# Patient Record
Sex: FEMALE | Race: WHITE | NOT HISPANIC OR LATINO | Employment: UNEMPLOYED | URBAN - METROPOLITAN AREA
[De-identification: names, ages, dates, MRNs, and addresses within clinical notes are randomized per-mention and may not be internally consistent; named-entity substitution may affect disease eponyms.]

---

## 2017-05-16 ENCOUNTER — TELEPHONE (OUTPATIENT)
Dept: MEDICAL GROUP | Facility: PHYSICIAN GROUP | Age: 62
End: 2017-05-16

## 2017-06-21 ENCOUNTER — HOSPITAL ENCOUNTER (OUTPATIENT)
Facility: MEDICAL CENTER | Age: 62
End: 2017-06-21
Payer: COMMERCIAL

## 2017-06-21 LAB
ALBUMIN SERPL BCP-MCNC: 4.3 G/DL (ref 3.2–4.9)
ALBUMIN/GLOB SERPL: 1.5 G/DL
ALP SERPL-CCNC: 80 U/L (ref 30–99)
ALT SERPL-CCNC: 25 U/L (ref 2–50)
ANION GAP SERPL CALC-SCNC: 8 MMOL/L (ref 0–11.9)
APPEARANCE UR: CLEAR
AST SERPL-CCNC: 23 U/L (ref 12–45)
BILIRUB SERPL-MCNC: 0.7 MG/DL (ref 0.1–1.5)
BILIRUB UR QL STRIP.AUTO: NEGATIVE
BUN SERPL-MCNC: 12 MG/DL (ref 8–22)
CALCIUM SERPL-MCNC: 10.1 MG/DL (ref 8.5–10.5)
CHLORIDE SERPL-SCNC: 110 MMOL/L (ref 96–112)
CHOLEST SERPL-MCNC: 172 MG/DL (ref 100–199)
CO2 SERPL-SCNC: 23 MMOL/L (ref 20–33)
COLOR UR: COLORLESS
CREAT SERPL-MCNC: 0.66 MG/DL (ref 0.5–1.4)
EPI CELLS #/AREA URNS HPF: NORMAL /HPF
ERYTHROCYTE [DISTWIDTH] IN BLOOD BY AUTOMATED COUNT: 42.9 FL (ref 35.9–50)
GFR SERPL CREATININE-BSD FRML MDRD: >60 ML/MIN/1.73 M 2
GLOBULIN SER CALC-MCNC: 2.8 G/DL (ref 1.9–3.5)
GLUCOSE SERPL-MCNC: 97 MG/DL (ref 65–99)
GLUCOSE UR STRIP.AUTO-MCNC: NEGATIVE MG/DL
HCT VFR BLD AUTO: 40 % (ref 37–47)
HDLC SERPL-MCNC: 75 MG/DL
HGB BLD-MCNC: 13.6 G/DL (ref 12–16)
KETONES UR STRIP.AUTO-MCNC: NEGATIVE MG/DL
LDLC SERPL CALC-MCNC: 86 MG/DL
LEUKOCYTE ESTERASE UR QL STRIP.AUTO: ABNORMAL
MCH RBC QN AUTO: 31.6 PG (ref 27–33)
MCHC RBC AUTO-ENTMCNC: 34 G/DL (ref 33.6–35)
MCV RBC AUTO: 92.8 FL (ref 81.4–97.8)
MICRO URNS: ABNORMAL
NITRITE UR QL STRIP.AUTO: NEGATIVE
PH UR STRIP.AUTO: 7.5 [PH]
PLATELET # BLD AUTO: 211 K/UL (ref 164–446)
PMV BLD AUTO: 10.9 FL (ref 9–12.9)
POTASSIUM SERPL-SCNC: 4.1 MMOL/L (ref 3.6–5.5)
PROT SERPL-MCNC: 7.1 G/DL (ref 6–8.2)
PROT UR QL STRIP: NEGATIVE MG/DL
RBC # BLD AUTO: 4.31 M/UL (ref 4.2–5.4)
RBC # URNS HPF: NORMAL /HPF
RBC UR QL AUTO: NEGATIVE
SODIUM SERPL-SCNC: 141 MMOL/L (ref 135–145)
SP GR UR STRIP.AUTO: 1
T4 FREE SERPL-MCNC: 0.77 NG/DL (ref 0.53–1.43)
TRIGL SERPL-MCNC: 55 MG/DL (ref 0–149)
WBC # BLD AUTO: 4.1 K/UL (ref 4.8–10.8)
WBC #/AREA URNS HPF: NORMAL /HPF

## 2017-07-27 ENCOUNTER — OFFICE VISIT (OUTPATIENT)
Dept: MEDICAL GROUP | Facility: PHYSICIAN GROUP | Age: 62
End: 2017-07-27
Payer: COMMERCIAL

## 2017-07-27 ENCOUNTER — HOSPITAL ENCOUNTER (OUTPATIENT)
Dept: LAB | Facility: MEDICAL CENTER | Age: 62
End: 2017-07-27
Attending: FAMILY MEDICINE
Payer: COMMERCIAL

## 2017-07-27 VITALS
OXYGEN SATURATION: 99 % | TEMPERATURE: 98 F | SYSTOLIC BLOOD PRESSURE: 110 MMHG | BODY MASS INDEX: 26.63 KG/M2 | WEIGHT: 175.71 LBS | DIASTOLIC BLOOD PRESSURE: 70 MMHG | HEART RATE: 57 BPM | RESPIRATION RATE: 15 BRPM | HEIGHT: 68 IN

## 2017-07-27 DIAGNOSIS — Z00.00 ROUTINE PHYSICAL EXAMINATION: ICD-10-CM

## 2017-07-27 DIAGNOSIS — L65.9 HAIR LOSS: ICD-10-CM

## 2017-07-27 DIAGNOSIS — D70.9 NEUTROPENIA, UNSPECIFIED TYPE (HCC): ICD-10-CM

## 2017-07-27 DIAGNOSIS — Z12.11 SCREEN FOR COLON CANCER: ICD-10-CM

## 2017-07-27 LAB
BASOPHILS # BLD AUTO: 0.9 % (ref 0–1.8)
BASOPHILS # BLD: 0.03 K/UL (ref 0–0.12)
EOSINOPHIL # BLD AUTO: 0.09 K/UL (ref 0–0.51)
EOSINOPHIL NFR BLD: 2.7 % (ref 0–6.9)
ERYTHROCYTE [DISTWIDTH] IN BLOOD BY AUTOMATED COUNT: 46.3 FL (ref 35.9–50)
HCT VFR BLD AUTO: 39.4 % (ref 37–47)
HGB BLD-MCNC: 12.8 G/DL (ref 12–16)
IMM GRANULOCYTES # BLD AUTO: 0.01 K/UL (ref 0–0.11)
IMM GRANULOCYTES NFR BLD AUTO: 0.3 % (ref 0–0.9)
LYMPHOCYTES # BLD AUTO: 1.13 K/UL (ref 1–4.8)
LYMPHOCYTES NFR BLD: 33.5 % (ref 22–41)
MCH RBC QN AUTO: 31.1 PG (ref 27–33)
MCHC RBC AUTO-ENTMCNC: 32.5 G/DL (ref 33.6–35)
MCV RBC AUTO: 95.6 FL (ref 81.4–97.8)
MONOCYTES # BLD AUTO: 0.33 K/UL (ref 0–0.85)
MONOCYTES NFR BLD AUTO: 9.8 % (ref 0–13.4)
NEUTROPHILS # BLD AUTO: 1.78 K/UL (ref 2–7.15)
NEUTROPHILS NFR BLD: 52.8 % (ref 44–72)
NRBC # BLD AUTO: 0 K/UL
NRBC BLD AUTO-RTO: 0 /100 WBC
PLATELET # BLD AUTO: 233 K/UL (ref 164–446)
PMV BLD AUTO: 11 FL (ref 9–12.9)
RBC # BLD AUTO: 4.12 M/UL (ref 4.2–5.4)
TSH SERPL DL<=0.005 MIU/L-ACNC: 2.39 UIU/ML (ref 0.3–3.7)
WBC # BLD AUTO: 3.4 K/UL (ref 4.8–10.8)

## 2017-07-27 PROCEDURE — 84443 ASSAY THYROID STIM HORMONE: CPT

## 2017-07-27 PROCEDURE — 85025 COMPLETE CBC W/AUTO DIFF WBC: CPT

## 2017-07-27 PROCEDURE — 36415 COLL VENOUS BLD VENIPUNCTURE: CPT

## 2017-07-27 PROCEDURE — 99386 PREV VISIT NEW AGE 40-64: CPT | Performed by: FAMILY MEDICINE

## 2017-07-27 ASSESSMENT — PATIENT HEALTH QUESTIONNAIRE - PHQ9: CLINICAL INTERPRETATION OF PHQ2 SCORE: 0

## 2017-07-27 NOTE — Clinical Note
[facility]  Uma Carrillo M.D.  [address]  [place] [address]  Fax: [phone]   Authorization for Release/Disclosure of   Protected Health Information   Name: CARMEN RAMIREZ : 1955 SSN: [id number]   Address: [facility] [place] [address] Phone:    [phone] (home)    I authorize the entity listed below to release/disclose the PHI below to:   [facility]/Uma Carrillo M.D. and Uma Carrillo M.D.   Provider or Entity Name:    Rona Jacobsen MD   Address   City, State, [facility]   Phone:      Fax:     Reason for request: continuity of care   Information to be released:    [  ] LAST COLONOSCOPY,  including any PATH REPORT and follow-up  [  ] LAST FIT/COLOGUARD RESULT [  ] LAST DEXA  [  ] LAST MAMMOGRAM  [  ] LAST PAP  [  ] LAST LABS [  ] RETINA EXAM REPORT  [  ] IMMUNIZATION RECORDS  [  ] Release all info      [  ] Check here and initial the line next to each item to release ALL health information INCLUDING  _____ Care and treatment for drug and / or alcohol abuse  _____ HIV testing, infection status, or AIDS  _____ Genetic Testing    DATES OF SERVICE OR TIME PERIOD TO BE DISCLOSED: _____________  I understand and acknowledge that:  * This Authorization may be revoked at any time by you in writing, except if your health information has already been used or disclosed.  * Your health information that will be used or disclosed as a result of you signing this authorization could be re-disclosed by the recipient. If this occurs, your re-disclosed health information may no longer be protected by State or Federal laws.  * You may refuse to sign this Authorization. Your refusal will not affect your ability to obtain treatment.  * This Authorization becomes effective upon signing and will  on (date) __________.      If no date is indicated, this Authorization will  one (1) year from the signature date.    Name: Carmen Ramirez    Signature:   Date:     2017       PLEASE  FAX REQUESTED RECORDS BACK TO: [phone]

## 2017-07-27 NOTE — PROGRESS NOTES
Subjective:      Carmen Ramirez is a 61 y.o. female who presents with Other            Other        This is a 61-year-old white female who is here to establish with me.. I previously saw her almost 4 years ago in 9/13 as a new patient.    She is here for physical exam. She sees Dr. Schreiber, GYN for her GYN care. She said she had an abnormal Pap smear that required a LEEP procedure. Subsequent Pap smears thereafter came back normal. The last one was in 11/16. She said she had a mammogram last year through [facility] which was normal.    She brought copies of lab work that she just did through [facility].    She has been having hair loss recently. Unfortunately TSH was not included in her lab work.    She has refused colonoscopy but she agrees to have the FIT. The last one was done in 3/16 which came back negative.    I reviewed the following    Past Medical History   Diagnosis Date   • Other specified symptom associated with female genital organs      abnormal pap, HPV   • Insomnia         Past Surgical History   Procedure Laterality Date   • Cervical conization  3/6/2013     Performed by Rona Jacobsen M.D. at SURGERY SAME DAY [facility] ORS   • Dilation and curettage       retained placenta       No Known Allergies    Current Outpatient Prescriptions   Medication Sig Dispense Refill   • Probiotic Product (PROBIOTIC DAILY PO) Take  by mouth.     • Multiple Vitamins-Minerals (MULTIVITAMIN PO) Take 1 Tab by mouth every day.       • Naproxen Sodium (ALEVE) 220 MG CAPS Take 1 Cap by mouth as needed.    Indications: Mild to Moderate Pain     • zolpidem (AMBIEN) 10 MG TABS Take 10 mg by mouth at bedtime as needed.     • Nutritional Supplements (ESTROVEN PO) Take 1 Tab by mouth every 48 hours.         No current facility-administered medications for this visit.        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   HEENT: Negative for ear pain, nosebleeds, congestion, sore throat and neck pain.    Eyes: Negative for blurred vision.   Respiratory: Negative for cough, sputum production, shortness of breath and wheezing.    Cardiovascular: Negative for chest pain, palpitations, orthopnea and leg swelling.   Gastrointestinal: Negative for heartburn, nausea, vomiting and abdominal pain.   Genitourinary: Negative for dysuria, urgency and frequency.   Musculoskeletal: Negative for myalgias, back pain and joint pain.   Skin: Negative for rash and itching.   Neurological: Negative for dizziness, tingling, tremors, sensory change, focal weakness and headaches.   Endo/Heme/Allergies: Does not bruise/bleed easily.   Psychiatric/Behavioral: Negative for depression, anxiety, or memory loss.     All other systems reviewed and are negative except as in HPI.         Objective:     /70 mmHg  Pulse 57  Temp(Src) 36.7 °C (98 °F)  Resp 15  Ht 1.727 m (5' 8\")  Wt 79.7 kg (175 lb 11.3 oz)  BMI 26.72 kg/m2  SpO2 99%     Physical Exam   Constitutional: She is oriented to person, place, and time. She appears " well-developed and well-nourished. No distress.   HENT:   Head: Normocephalic and atraumatic.   Right Ear: External ear normal.   Left Ear: External ear normal.   Nose: Nose normal.   Mouth/Throat: Oropharynx is clear and moist. No oropharyngeal exudate.   Eyes: Conjunctivae and EOM are normal. Pupils are equal, round, and reactive to light. Right eye exhibits no discharge. Left eye exhibits no discharge. No scleral icterus.   Neck: Normal range of motion. Neck supple. No JVD present. No tracheal deviation present. No thyromegaly present.   Cardiovascular: Normal rate, regular rhythm, normal heart sounds and intact distal pulses.  Exam reveals no gallop and no friction rub.    No murmur heard.  Pulmonary/Chest: Effort normal and breath sounds normal. No stridor. No respiratory distress. She has no wheezes. She has no rales. She exhibits no tenderness.   Abdominal: Soft. Bowel sounds are normal. She exhibits no distension and no mass. There is no tenderness. There is no rebound and no guarding. No hernia.   Musculoskeletal: Normal range of motion. She exhibits no edema, tenderness or deformity.   Lymphadenopathy:     She has no cervical adenopathy.   Neurological: She is alert and oriented to person, place, and time. She has normal reflexes. She displays normal reflexes. No cranial nerve deficit. She exhibits normal muscle tone. Coordination normal.   Skin: Skin is warm and dry. No rash noted. She is not diaphoretic. No erythema. No pallor.   Psychiatric: She has a normal mood and affect. Her behavior is normal. Judgment and thought content normal.   Nursing note and vitals reviewed.     No visits with results within 1 Month(s) from this visit.  Latest known visit with results is:    Hospital Outpatient Visit on 06/21/2017   Component Date Value   • WBC 06/21/2017 4.1*previously 3.6    • RBC 06/21/2017 4.31    • Hemoglobin 06/21/2017 13.6    • Hematocrit 06/21/2017 40.0    • MCV 06/21/2017 92.8    • MCH 06/21/2017  31.6    • MCHC 06/21/2017 34.0    • RDW 06/21/2017 42.9    • Platelet Count 06/21/2017 211    • MPV 06/21/2017 10.9    • Sodium 06/21/2017 141    • Potassium 06/21/2017 4.1    • Chloride 06/21/2017 110    • Co2 06/21/2017 23    • Anion Gap 06/21/2017 8.0    • Glucose 06/21/2017 97    • Bun 06/21/2017 12    • Creatinine 06/21/2017 0.66    • Calcium 06/21/2017 10.1    • AST(SGOT) 06/21/2017 23    • ALT(SGPT) 06/21/2017 25    • Alkaline Phosphatase 06/21/2017 80    • Total Bilirubin 06/21/2017 0.7    • Albumin 06/21/2017 4.3    • Total Protein 06/21/2017 7.1    • Globulin 06/21/2017 2.8    • A-G Ratio 06/21/2017 1.5    • Cholesterol,Tot 06/21/2017 172    • Triglycerides 06/21/2017 55    • HDL 06/21/2017 75    • LDL 06/21/2017 86    • Free T-4 06/21/2017 0.77    • Micro Urine Req 06/21/2017 Microscopic    • Color 06/21/2017 Colorless    • Character 06/21/2017 Clear    • Specific Gravity 06/21/2017 1.002    • Ph 06/21/2017 7.5    • Glucose 06/21/2017 Negative    • Ketones 06/21/2017 Negative    • Protein 06/21/2017 Negative    • Bilirubin 06/21/2017 Negative    • Nitrite 06/21/2017 Negative    • Leukocyte Esterase 06/21/2017 Trace*   • Occult Blood 06/21/2017 Negative    • WBC 06/21/2017 0-2    • RBC 06/21/2017 0-2    • Epithelial Cells 06/21/2017 Few    • GFR If  06/21/2017 >60    • GFR If Non  Ameri* 06/21/2017 >60                  Assessment/Plan:     1. Routine physical examination  Complete exam done. She sees her gynecologist for GYN exam/Pap smear images up-to-date. I will get records. She refuses immunizations.    2. Neutropenia, unspecified type (CMS-HCC)  She had mild neutropenia with lymphocytosis in 2013. Most recent blood work still shows neutropenia but better compared to last time. Differential count was not done. I will redo a CBC including differential count. She is asymptomatic without any recurrent infections. Consider referral to hematologist depending on results.  - CBC WITH  DIFFERENTIAL; Future    3. Hair loss  We will check TSH.  - TSH; Future    4. Screen for colon cancer  She continues to refuse colonoscopy but she agrees to IT.  - OCCULT BLOOD FECES IMMUNOASSAY; Future    Follow-up as needed.      Please note that this dictation was created using voice recognition software. I have worked with consultants from the vendor as well as technical experts from [facility] to optimize the interface. I have made every reasonable attempt to correct obvious errors, but I expect that there are errors of grammar and possibly content I did not discover before finalizing the note.

## 2017-07-27 NOTE — Clinical Note
[facility]  Uma Carrillo M.D.  [address]  [place] [address]  Fax: [phone]   Authorization for Release/Disclosure of   Protected Health Information   Name: CARMEN KEY : 1955 SSN: [id number]   Address: [facility] [place] [address] Phone:    [phone] (home)    I authorize the entity listed below to release/disclose the PHI below to:   [facility]/Uma Carrillo M.D. and Uma Carrillo M.D.   Provider or Entity Name:  Rona Jacobsen MD   Address [address] [facility], [place] [address]   Phone:  [phone]    Fax:     Reason for request: continuity of care   Information to be released:    [  ] LAST COLONOSCOPY,  including any PATH REPORT and follow-up  [  ] LAST FIT/COLOGUARD RESULT [  ] LAST DEXA  [  ] LAST MAMMOGRAM  [x  ] LAST PAP  [  ] LAST LABS [  ] RETINA EXAM REPORT  [  ] IMMUNIZATION RECORDS  [  ] Release all info      [  ] Check here and initial the line next to each item to release ALL health information INCLUDING  _____ Care and treatment for drug and / or alcohol abuse  _____ HIV testing, infection status, or AIDS  _____ Genetic Testing    DATES OF SERVICE OR TIME PERIOD TO BE DISCLOSED: _____________  I understand and acknowledge that:  * This Authorization may be revoked at any time by you in writing, except if your health information has already been used or disclosed.  * Your health information that will be used or disclosed as a result of you signing this authorization could be re-disclosed by the recipient. If this occurs, your re-disclosed health information may no longer be protected by State or Federal laws.  * You may refuse to sign this Authorization. Your refusal will not affect your ability to obtain treatment.  * This Authorization becomes effective upon signing and will  on (date) __________.      If no date is indicated, this Authorization will  one (1) year from the signature date.    Name: Carmen Nava  Ashley    Signature:   Date:     7/27/2017       PLEASE FAX REQUESTED RECORDS BACK TO: [phone]

## 2017-07-27 NOTE — Clinical Note
[facility]  Uma Carrillo M.[facility].  [address]  [place] [address]  Fax: [phone]   Authorization for Release/Disclosure of   Protected Health Information   Name: CARMEN RAMIREZ : 1955 SSN: [id number]   Address: [facility] [place]  [facility] [address] Phone:    [phone] (home)    I authorize the entity listed below to release/disclose the PHI below to:   [facility]/Uma Carrillo M.D. and Uma Carrillo M.D.   Provider or Entity Name:  Rona Schreiber MD   Address   City, State, [place], [place]  Phone:      Fax:     Reason for request: continuity of care   Information to be released:    [  ] LAST COLONOSCOPY,  including any PATH REPORT and follow-up  [  ] LAST FIT/COLOGUARD RESULT [  ] LAST DEXA  [  ] LAST MAMMOGRAM  [  ] LAST PAP  [  ] LAST LABS [  ] RETINA EXAM REPORT  [  ] IMMUNIZATION RECORDS  [  ] Release all info      [  ] Check here and initial the line next to each item to release ALL health information INCLUDING  _____ Care and treatment for drug and / or alcohol abuse  _____ HIV testing, infection status, or AIDS  _____ Genetic Testing    DATES OF SERVICE OR TIME PERIOD TO BE DISCLOSED: _____________  I understand and acknowledge that:  * This Authorization may be revoked at any time by you in writing, except if your health information has already been used or disclosed.  * Your health information that will be used or disclosed as a result of you signing this authorization could be re-disclosed by the recipient. If this occurs, your re-disclosed health information may no longer be protected by State or Federal laws.  * You may refuse to sign this Authorization. Your refusal will not affect your ability to obtain treatment.  * This Authorization becomes effective upon signing and will  on (date) __________.      If no date is indicated, this Authorization will  one (1) year from the signature date.    Name: Carmen Ramirez    Signature:   Date:     2017            PLEASE FAX REQUESTED RECORDS BACK TO: [phone]

## 2017-07-27 NOTE — MR AVS SNAPSHOT
"        Carmen Ramirez   2017 8:20 AM   Office Visit   MRN: [id number]    Department:  [facility]   Dept Phone:  [phone]    Description:  Female : 1955   Provider:  Uma Carrillo M.D.           Reason for Visit     Other re establish      Allergies as of 2017     No Known Allergies      You were diagnosed with     Routine physical examination   [538319]       Neutropenia, unspecified type (CMS-HCC)   [5396549]       Hair loss   [296388]       Screen for colon cancer   [387937]         Vital Signs     Blood Pressure Pulse Temperature Respirations Height Weight    110/70 mmHg 57 36.7 °C (98 °F) 15 1.727 m (5' 8\") 79.7 kg (175 lb 11.3 oz)    Body Mass Index Oxygen Saturation Smoking Status             26.72 kg/m2 99% Never Smoker          Basic Information     Date Of Birth Sex Race Ethnicity Preferred Language    1955 Female White Non- English      Problem List              ICD-10-CM Priority Class Noted - Resolved    Insomnia G47.00   Unknown - Present      Health Maintenance        Date Due Completion Dates    IMM DTaP/Tdap/Td Vaccine (1 - Tdap) 1974 ---    COLONOSCOPY 2005 ---    MAMMOGRAM 9/10/2014 9/10/2013    IMM ZOSTER VACCINE 2015 ---    IMM INFLUENZA (1) 2017 ---    PAP SMEAR 2019 (Done)    Override on 2016: Done (Dr. Jacobsen)            Current Immunizations     No immunizations on file.      Below and/or attached are the medications your provider expects you to take. Review all of your home medications and newly ordered medications with your provider and/or pharmacist. Follow medication instructions as directed by your provider and/or pharmacist. Please keep your medication list with you and share with your provider. Update the information when medications are discontinued, doses are changed, or new medications (including over-the-counter products) are added; and carry medication information at all times in the event of emergency " situations     Allergies:  No Known Allergies          Medications  Valid as of: July 27, 2017 -  8:57 AM    Generic Name Brand Name Tablet Size Instructions for use    Multiple Vitamins-Minerals   Take 1 Tab by mouth every day.          Naproxen Sodium (Cap) Naproxen Sodium 220 MG Take 1 Cap by mouth as needed.    Indications: Mild to Moderate Pain        Nutritional Supplements   Take 1 Tab by mouth every 48 hours.          Probiotic Product   Take  by mouth.        Zolpidem Tartrate (Tab) AMBIEN 10 MG Take 10 mg by mouth at bedtime as needed.        .                 Medicines prescribed today were sent to:     \[facility]\"" [facility] #[id number] - [place], [address] AT [address] [address]    Phone: [phone] Fax: [phone]    Open 24 Hours?: No      Medication refill instructions:       If your prescription bottle indicates you have medication refills left, it is not necessary to call your provider’s office. Please contact your pharmacy and they will refill your medication.    If your prescription bottle indicates you do not have any refills left, you may request refills at any time through one of the following ways: The online [employer] system (except Urgent Care), by calling your provider’s office, or by asking your pharmacy to contact your provider’s office with a refill request. Medication refills are processed only during regular business hours and may not be available until the next business day. Your provider may request additional information or to have a follow-up visit with you prior to refilling your medication.   *Please Note: Medication refills are assigned a new Rx number when refilled electronically. Your pharmacy may indicate that no refills were authorized even though a new prescription for the same medication is available at the pharmacy. Please request the medicine by name with the pharmacy before contacting your provider for a refill.        Your To Do List      Future Labs/Procedures Complete By Expires    CBC WITH DIFFERENTIAL  As directed 7/28/2018    OCCULT BLOOD FECES IMMUNOASSAY  As directed 7/27/2018    TSH  As directed 7/28/2018         [employer] Access Code: [email or web address]  Expires: 8/26/2017  8:57 AM    Your email address is not on file at [facility].  Email Addresses are required for you to sign up for [employer], please contact [phone] to verify your personal information and to provide your email address prior to attempting to register for [employer].    Carmen Brandyyoli Ramirez  [address] [place] [address]    [employer]  A secure, online tool to manage your health information     [facility]’s [employer]® is a secure, online tool that connects you to your personalized health information from the privacy of your home -- day or night - making it very easy for you to manage your healthcare. Once the activation process is completed, you can even access your medical information using the [employer] sivakumar, which is available for free in the Apple Sivakumar store or Google Play store.     To learn more about [employer], visit [email or web address]    There are two levels of access available (as shown below):   My Chart Features  [facility] Primary Care Doctor [facility]  Specialists [facility]  Urgent  Care Non-[facility] Primary Care Doctor   Email your healthcare team securely and privately 24/7 X X X    Manage appointments: schedule your next appointment; view details of past/upcoming appointments X      Request prescription refills. X      View recent personal medical records, including lab and immunizations X X X X   View health record, including health history, allergies, medications X X X X   Read reports about your outpatient visits, procedures, consult and ER notes X X X X   See your discharge summary, which is a recap of your hospital and/or ER visit that includes your diagnosis, lab results, and care plan X X  X     How to register for [employer]:  Once your e-mail address has been  verified, follow the following steps to sign up for [employer].     1. Go to  [email or web address]  2. Click on the Sign Up Now box, which takes you to the New Member Sign Up page. You will need to provide the following information:  a. Enter your [employer] Access Code exactly as it appears at the top of this page. (You will not need to use this code after you’ve completed the sign-up process. If you do not sign up before the expiration date, you must request a new code.)   b. Enter your date of birth.   c. Enter your home email address.   d. Click Submit, and follow the next screen’s instructions.  3. Create a [employer] ID. This will be your [employer] login ID and cannot be changed, so think of one that is secure and easy to remember.  4. Create a [employer] password. You can change your password at any time.  5. Enter your Password Reset Question and Answer. This can be used at a later time if you forget your password.   6. Enter your e-mail address. This allows you to receive e-mail notifications when new information is available in [employer].  7. Click Sign Up. You can now view your health information.    For assistance activating your [employer] account, call [phone]

## 2017-07-28 ENCOUNTER — HOSPITAL ENCOUNTER (OUTPATIENT)
Facility: MEDICAL CENTER | Age: 62
End: 2017-07-28
Attending: FAMILY MEDICINE
Payer: COMMERCIAL

## 2017-07-28 PROCEDURE — 82274 ASSAY TEST FOR BLOOD FECAL: CPT

## 2017-08-01 DIAGNOSIS — Z12.11 SCREEN FOR COLON CANCER: ICD-10-CM

## 2017-08-02 LAB — HEMOCCULT STL QL IA: NEGATIVE

## 2017-09-25 DIAGNOSIS — D70.9 NEUTROPENIA, UNSPECIFIED TYPE (HCC): ICD-10-CM

## 2018-04-19 ENCOUNTER — HOSPITAL ENCOUNTER (OUTPATIENT)
Dept: RADIOLOGY | Facility: MEDICAL CENTER | Age: 63
End: 2018-04-19

## 2018-04-25 ENCOUNTER — HOSPITAL ENCOUNTER (OUTPATIENT)
Dept: RADIOLOGY | Facility: MEDICAL CENTER | Age: 63
End: 2018-04-25
Attending: OBSTETRICS & GYNECOLOGY
Payer: COMMERCIAL

## 2018-04-25 DIAGNOSIS — N64.4 BREAST PAIN, RIGHT: ICD-10-CM

## 2018-04-25 PROCEDURE — 77066 DX MAMMO INCL CAD BI: CPT

## 2018-08-08 ENCOUNTER — OFFICE VISIT (OUTPATIENT)
Dept: MEDICAL GROUP | Facility: PHYSICIAN GROUP | Age: 63
End: 2018-08-08
Payer: COMMERCIAL

## 2018-08-08 ENCOUNTER — HOSPITAL ENCOUNTER (OUTPATIENT)
Dept: LAB | Facility: MEDICAL CENTER | Age: 63
End: 2018-08-08
Attending: FAMILY MEDICINE
Payer: COMMERCIAL

## 2018-08-08 VITALS
SYSTOLIC BLOOD PRESSURE: 110 MMHG | HEIGHT: 68 IN | BODY MASS INDEX: 27.33 KG/M2 | DIASTOLIC BLOOD PRESSURE: 60 MMHG | WEIGHT: 180.34 LBS | TEMPERATURE: 98.2 F | OXYGEN SATURATION: 98 % | HEART RATE: 58 BPM

## 2018-08-08 DIAGNOSIS — Z00.00 ROUTINE PHYSICAL EXAMINATION: ICD-10-CM

## 2018-08-08 DIAGNOSIS — D70.9 NEUTROPENIA, UNSPECIFIED TYPE (HCC): ICD-10-CM

## 2018-08-08 DIAGNOSIS — Z13.1 SCREENING FOR DIABETES MELLITUS (DM): ICD-10-CM

## 2018-08-08 DIAGNOSIS — Z13.220 SCREENING, LIPID: ICD-10-CM

## 2018-08-08 DIAGNOSIS — Z12.11 SCREENING FOR COLON CANCER: ICD-10-CM

## 2018-08-08 LAB
ALBUMIN SERPL BCP-MCNC: 4.7 G/DL (ref 3.2–4.9)
ALBUMIN/GLOB SERPL: 2.1 G/DL
ALP SERPL-CCNC: 79 U/L (ref 30–99)
ALT SERPL-CCNC: 19 U/L (ref 2–50)
ANION GAP SERPL CALC-SCNC: 6 MMOL/L (ref 0–11.9)
AST SERPL-CCNC: 20 U/L (ref 12–45)
BASOPHILS # BLD AUTO: 0.8 % (ref 0–1.8)
BASOPHILS # BLD: 0.03 K/UL (ref 0–0.12)
BILIRUB SERPL-MCNC: 0.9 MG/DL (ref 0.1–1.5)
BUN SERPL-MCNC: 10 MG/DL (ref 8–22)
CALCIUM SERPL-MCNC: 10 MG/DL (ref 8.5–10.5)
CHLORIDE SERPL-SCNC: 103 MMOL/L (ref 96–112)
CHOLEST SERPL-MCNC: 169 MG/DL (ref 100–199)
CO2 SERPL-SCNC: 27 MMOL/L (ref 20–33)
CREAT SERPL-MCNC: 0.73 MG/DL (ref 0.5–1.4)
EOSINOPHIL # BLD AUTO: 0.16 K/UL (ref 0–0.51)
EOSINOPHIL NFR BLD: 4.2 % (ref 0–6.9)
ERYTHROCYTE [DISTWIDTH] IN BLOOD BY AUTOMATED COUNT: 42.7 FL (ref 35.9–50)
GLOBULIN SER CALC-MCNC: 2.2 G/DL (ref 1.9–3.5)
GLUCOSE SERPL-MCNC: 79 MG/DL (ref 65–99)
HCT VFR BLD AUTO: 38.8 % (ref 37–47)
HDLC SERPL-MCNC: 86 MG/DL
HGB BLD-MCNC: 12.7 G/DL (ref 12–16)
IMM GRANULOCYTES # BLD AUTO: 0 K/UL (ref 0–0.11)
IMM GRANULOCYTES NFR BLD AUTO: 0 % (ref 0–0.9)
LDLC SERPL CALC-MCNC: 75 MG/DL
LYMPHOCYTES # BLD AUTO: 1.77 K/UL (ref 1–4.8)
LYMPHOCYTES NFR BLD: 46.2 % (ref 22–41)
MCH RBC QN AUTO: 31.2 PG (ref 27–33)
MCHC RBC AUTO-ENTMCNC: 32.7 G/DL (ref 33.6–35)
MCV RBC AUTO: 95.3 FL (ref 81.4–97.8)
MONOCYTES # BLD AUTO: 0.34 K/UL (ref 0–0.85)
MONOCYTES NFR BLD AUTO: 8.9 % (ref 0–13.4)
NEUTROPHILS # BLD AUTO: 1.53 K/UL (ref 2–7.15)
NEUTROPHILS NFR BLD: 39.9 % (ref 44–72)
NRBC # BLD AUTO: 0 K/UL
NRBC BLD-RTO: 0 /100 WBC
PLATELET # BLD AUTO: 201 K/UL (ref 164–446)
PMV BLD AUTO: 10.6 FL (ref 9–12.9)
POTASSIUM SERPL-SCNC: 3.8 MMOL/L (ref 3.6–5.5)
PROT SERPL-MCNC: 6.9 G/DL (ref 6–8.2)
RBC # BLD AUTO: 4.07 M/UL (ref 4.2–5.4)
SODIUM SERPL-SCNC: 136 MMOL/L (ref 135–145)
TRIGL SERPL-MCNC: 42 MG/DL (ref 0–149)
WBC # BLD AUTO: 3.8 K/UL (ref 4.8–10.8)

## 2018-08-08 PROCEDURE — 80053 COMPREHEN METABOLIC PANEL: CPT

## 2018-08-08 PROCEDURE — 99396 PREV VISIT EST AGE 40-64: CPT | Performed by: FAMILY MEDICINE

## 2018-08-08 PROCEDURE — 36415 COLL VENOUS BLD VENIPUNCTURE: CPT

## 2018-08-08 PROCEDURE — 85025 COMPLETE CBC W/AUTO DIFF WBC: CPT

## 2018-08-08 PROCEDURE — 80061 LIPID PANEL: CPT

## 2018-08-08 ASSESSMENT — PATIENT HEALTH QUESTIONNAIRE - PHQ9: CLINICAL INTERPRETATION OF PHQ2 SCORE: 0

## 2018-08-08 NOTE — PROGRESS NOTES
Subjective:      Carmen Ramirez is a 63 y.o. female who presents with Annual Exam (Physical)            HPI     Patient is here for annual physical exam.  She said she already had her Pap smear done by her GYN Dr. Rona Jacobsen in April or May of this year her mammogram was in April 2018 that came back no evidence of malignancy.  She has not had a colonoscopy before.  Her last test was 8/17 and was negative.  She does not get flu shots in the fall.  She has not had a Tdap or shingles vaccine.    Patient has history of chronic mild neutropenia and I have referred her to the hematologist and she was seen and evaluated by Dr. Cardona in December 2017.  At that time CBC was done in this office in his WBC count came back normal at 4.5.  Dr. Cardona did not recommend any further intervention and thinks that this is a normal variant for the patient.  He said to refer her back if the absolute neutrophil count is below 1.2 on 2 medications at least a month apart.    I reviewed the following    Past Medical History:   Diagnosis Date   • Insomnia    • Other specified symptom associated with female genital organs     abnormal pap, HPV        Past Surgical History:   Procedure Laterality Date   • CERVICAL CONIZATION  3/6/2013    Performed by Rona Jacobesn M.D. at SURGERY SAME DAY [facility] ORS   • DILATION AND CURETTAGE      retained placenta       No Known Allergies    Current Outpatient Prescriptions   Medication Sig Dispense Refill   • Non Formulary Request plexus     • BIOTIN PO Take  by mouth.     • Rhodiola 300 MG Cap Take  by mouth.     • Multiple Vitamins-Minerals (MULTIVITAMIN PO) Take 1 Tab by mouth every day.       • Naproxen Sodium (ALEVE) 220 MG CAPS Take 1 Cap by mouth as needed.    Indications: Mild to Moderate Pain       No current facility-administered medications for this visit.         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     Objective:     /60   Pulse (!) 58   Temp 36.8 °C (98.2 °F)   Ht 1.727 m (5' 8\")   Wt 81.8 kg (180 lb 5.4 oz)   SpO2 98%   BMI 27.42 kg/m²      Physical Exam   Constitutional: She is oriented to person, place, and time. She appears well-developed and well-nourished. No distress.   HENT:   Head: Normocephalic and atraumatic.   Right Ear: External ear normal.   Left Ear: External ear normal.   Nose: Nose normal.   Mouth/Throat: Oropharynx is clear and moist. No oropharyngeal exudate.   Eyes: Pupils are equal, round, and reactive to light. Conjunctivae and EOM are normal. Right eye exhibits no discharge. Left eye exhibits no discharge. No scleral icterus.   Neck: Normal range of motion. Neck supple. No tracheal deviation present. No thyromegaly present.   Cardiovascular: Normal rate, regular rhythm and normal heart sounds.  Exam reveals no gallop.    No murmur heard.  Pulmonary/Chest: Effort normal and breath sounds normal. No stridor. No respiratory distress. She has no wheezes. She has no rales.   Abdominal: Soft. Bowel sounds are normal. She exhibits no distension and no mass. There is no tenderness. There is no rebound and no " guarding.   Musculoskeletal: Normal range of motion. She exhibits no edema or tenderness.   Lymphadenopathy:     She has no cervical adenopathy.   Neurological: She is alert and oriented to person, place, and time. She displays normal reflexes. No cranial nerve deficit. She exhibits normal muscle tone. Coordination normal.   Skin: Skin is warm. No rash noted. She is not diaphoretic. No erythema. No pallor.   Psychiatric: She has a normal mood and affect. Her behavior is normal. Thought content normal.                    Assessment/Plan:     1. Routine physical examination  Complete exam was done.  Discussed getting the flu shot in the fall.  She can try inquiring how much Tdap and shingles vaccine because under her insurance.  She can try to get Tdap from the health department.    2. Neutropenia, unspecified type (HCC)  Stable and chronic problem.  Evaluation done by hematologist who did not think further workup is necessary unless ANC is below 1.2 on 2 occasions.  We will do follow-up CBC.  - LIPID PROFILE; Future  - COMP METABOLIC PANEL; Future  - CBC WITH DIFFERENTIAL; Future    3. Screening, lipid  Screening lipid panel ordered.  - LIPID PROFILE; Future  - COMP METABOLIC PANEL; Future  - CBC WITH DIFFERENTIAL; Future    4. Screening for diabetes mellitus (DM)  Screening for diabetes blood work ordered.  - LIPID PROFILE; Future  - COMP METABOLIC PANEL; Future  - CBC WITH DIFFERENTIAL; Future    5. Screening for colon cancer  We will do FIT test.  Referral place a GI specialist for screening colonoscopy.  - OCCULT BLOOD FECES IMMUNOASSAY; Future  - REFERRAL TO GASTROENTEROLOGY      Please note that this dictation was created using voice recognition software. I have worked with consultants from the vendor as well as technical experts from [employer]  [employer] to optimize the interface. I have made every reasonable attempt to correct obvious errors, but I expect that there are errors of grammar and possibly content I did  not discover before finalizing the note.

## 2018-08-08 NOTE — LETTER
[facility]  Uma Carrillo M.D.  [address]  [place] [address]  Fax: [phone]   Authorization for Release/Disclosure of   Protected Health Information   Name: CARMEN RAMIREZ : 1955 SSN: [id number]   Address: [facility] [place] [address] Phone:    [phone] (home)    I authorize the entity listed below to release/disclose the PHI below to:   [facility]/Uma Carrillo M.D. and Uma Carrillo M.D.   Provider or Entity Name:    Dr.Elizabeth Jacobsen - gyn   Address   City, [facility]   Phone:      Fax:     Reason for request: continuity of care   Information to be released:    [  ] LAST COLONOSCOPY,  including any PATH REPORT and follow-up  [  ] LAST FIT/COLOGUARD RESULT [  ] LAST DEXA  [  ] LAST MAMMOGRAM  [ x ] LAST PAP  [  ] LAST LABS [  ] RETINA EXAM REPORT  [  ] IMMUNIZATION RECORDS  [  ] Release all info      [  ] Check here and initial the line next to each item to release ALL health information INCLUDING  _____ Care and treatment for drug and / or alcohol abuse  _____ HIV testing, infection status, or AIDS  _____ Genetic Testing    DATES OF SERVICE OR TIME PERIOD TO BE DISCLOSED: _____________  I understand and acknowledge that:  * This Authorization may be revoked at any time by you in writing, except if your health information has already been used or disclosed.  * Your health information that will be used or disclosed as a result of you signing this authorization could be re-disclosed by the recipient. If this occurs, your re-disclosed health information may no longer be protected by State or Federal laws.  * You may refuse to sign this Authorization. Your refusal will not affect your ability to obtain treatment.  * This Authorization becomes effective upon signing and will  on (date) __________.      If no date is indicated, this Authorization will  one (1) year from the signature date.    Name: Carmen Ramirez    Signature:   Date:     2018            PLEASE FAX REQUESTED RECORDS BACK TO: [phone]

## 2018-09-20 ENCOUNTER — HOSPITAL ENCOUNTER (OUTPATIENT)
Facility: MEDICAL CENTER | Age: 63
End: 2018-09-20
Attending: FAMILY MEDICINE
Payer: COMMERCIAL

## 2018-09-20 PROCEDURE — 82274 ASSAY TEST FOR BLOOD FECAL: CPT

## 2018-09-21 DIAGNOSIS — Z12.11 SCREENING FOR COLON CANCER: ICD-10-CM

## 2018-09-22 LAB — HEMOCCULT STL QL IA: NEGATIVE

## 2019-01-25 ENCOUNTER — TELEPHONE (OUTPATIENT)
Dept: MEDICAL GROUP | Facility: PHYSICIAN GROUP | Age: 64
End: 2019-01-25

## 2019-01-25 DIAGNOSIS — D70.9 NEUTROPENIA, UNSPECIFIED TYPE (HCC): ICD-10-CM

## 2019-01-25 DIAGNOSIS — Z13.1 SCREENING FOR DIABETES MELLITUS (DM): ICD-10-CM

## 2019-01-25 DIAGNOSIS — Z13.220 SCREENING, LIPID: ICD-10-CM

## 2019-07-10 ENCOUNTER — HOSPITAL ENCOUNTER (OUTPATIENT)
Dept: RADIOLOGY | Facility: MEDICAL CENTER | Age: 64
End: 2019-07-10
Attending: OBSTETRICS & GYNECOLOGY
Payer: COMMERCIAL

## 2019-07-10 DIAGNOSIS — Z12.39 SCREENING BREAST EXAMINATION: ICD-10-CM

## 2019-07-10 PROCEDURE — 77063 BREAST TOMOSYNTHESIS BI: CPT

## 2019-07-22 ENCOUNTER — HOSPITAL ENCOUNTER (OUTPATIENT)
Dept: LAB | Facility: MEDICAL CENTER | Age: 64
End: 2019-07-22
Attending: FAMILY MEDICINE
Payer: COMMERCIAL

## 2019-07-22 ENCOUNTER — OFFICE VISIT (OUTPATIENT)
Dept: MEDICAL GROUP | Facility: PHYSICIAN GROUP | Age: 64
End: 2019-07-22
Payer: COMMERCIAL

## 2019-07-22 VITALS
SYSTOLIC BLOOD PRESSURE: 116 MMHG | TEMPERATURE: 97.6 F | HEIGHT: 68 IN | WEIGHT: 177.91 LBS | HEART RATE: 61 BPM | OXYGEN SATURATION: 96 % | BODY MASS INDEX: 26.96 KG/M2 | DIASTOLIC BLOOD PRESSURE: 80 MMHG

## 2019-07-22 DIAGNOSIS — Z12.11 SCREEN FOR COLON CANCER: ICD-10-CM

## 2019-07-22 DIAGNOSIS — L72.3 SEBACEOUS CYST: ICD-10-CM

## 2019-07-22 DIAGNOSIS — Z23 NEED FOR DIPHTHERIA-TETANUS-PERTUSSIS (TDAP) VACCINE: ICD-10-CM

## 2019-07-22 DIAGNOSIS — Z13.1 SCREENING FOR DIABETES MELLITUS (DM): ICD-10-CM

## 2019-07-22 DIAGNOSIS — Z00.00 ROUTINE PHYSICAL EXAMINATION: ICD-10-CM

## 2019-07-22 DIAGNOSIS — D70.9 NEUTROPENIA, UNSPECIFIED TYPE (HCC): ICD-10-CM

## 2019-07-22 DIAGNOSIS — Z13.220 SCREENING, LIPID: ICD-10-CM

## 2019-07-22 LAB
BASOPHILS # BLD AUTO: 1.1 % (ref 0–1.8)
BASOPHILS # BLD: 0.04 K/UL (ref 0–0.12)
EOSINOPHIL # BLD AUTO: 0.17 K/UL (ref 0–0.51)
EOSINOPHIL NFR BLD: 4.8 % (ref 0–6.9)
ERYTHROCYTE [DISTWIDTH] IN BLOOD BY AUTOMATED COUNT: 47.2 FL (ref 35.9–50)
HCT VFR BLD AUTO: 41.2 % (ref 37–47)
HGB BLD-MCNC: 13.2 G/DL (ref 12–16)
IMM GRANULOCYTES # BLD AUTO: 0.01 K/UL (ref 0–0.11)
IMM GRANULOCYTES NFR BLD AUTO: 0.3 % (ref 0–0.9)
LYMPHOCYTES # BLD AUTO: 1.43 K/UL (ref 1–4.8)
LYMPHOCYTES NFR BLD: 40.7 % (ref 22–41)
MCH RBC QN AUTO: 31.3 PG (ref 27–33)
MCHC RBC AUTO-ENTMCNC: 32 G/DL (ref 33.6–35)
MCV RBC AUTO: 97.6 FL (ref 81.4–97.8)
MONOCYTES # BLD AUTO: 0.32 K/UL (ref 0–0.85)
MONOCYTES NFR BLD AUTO: 9.1 % (ref 0–13.4)
NEUTROPHILS # BLD AUTO: 1.54 K/UL (ref 2–7.15)
NEUTROPHILS NFR BLD: 44 % (ref 44–72)
NRBC # BLD AUTO: 0 K/UL
NRBC BLD-RTO: 0 /100 WBC
PLATELET # BLD AUTO: 206 K/UL (ref 164–446)
PMV BLD AUTO: 10.4 FL (ref 9–12.9)
RBC # BLD AUTO: 4.22 M/UL (ref 4.2–5.4)
WBC # BLD AUTO: 3.5 K/UL (ref 4.8–10.8)

## 2019-07-22 PROCEDURE — 36415 COLL VENOUS BLD VENIPUNCTURE: CPT

## 2019-07-22 PROCEDURE — 85025 COMPLETE CBC W/AUTO DIFF WBC: CPT

## 2019-07-22 PROCEDURE — 99396 PREV VISIT EST AGE 40-64: CPT | Performed by: FAMILY MEDICINE

## 2019-07-22 PROCEDURE — 80053 COMPREHEN METABOLIC PANEL: CPT

## 2019-07-22 PROCEDURE — 80061 LIPID PANEL: CPT

## 2019-07-22 RX ORDER — CHLORAL HYDRATE 500 MG
1000 CAPSULE ORAL
COMMUNITY
End: 2020-07-16

## 2019-07-22 ASSESSMENT — PATIENT HEALTH QUESTIONNAIRE - PHQ9: CLINICAL INTERPRETATION OF PHQ2 SCORE: 0

## 2019-07-22 NOTE — PROGRESS NOTES
Subjective:      Carmen Ramirez is a 63 y.o. female who presents with Annual Exam      HPI:    Patient presents today for an annual examination. Her last pap smear was already completed earlier this year by Dr. Rona Jacobsen (GYN) which was normal. Her last mammogram was earlier this month and did not demonstrate any evidence of malignancy. She completed the FIT test ordered last year on her annual exam, and this returned as negative as well. She attempted to get a colonoscopy, but she was unable to due to cost. Due to her  recently returning to work again, she says she should be able to complete one when the time comes. In the meantime, she will continue the FIT testing. She did not receive her TDAP or Zoster vaccinations yet. She declines flu vaccinations yearly. Patient does not have any acute complaints. She has not had any major illnesses or hospitalizations.     Patient has a history of chronic mild neutropenia, and I previously referred her to Dr. Cardona (hematology) who she saw in December 2017. CBC completed his office returned with a normal WBC count.  Dr. Cardona recommended to send her back to him if her absolute neutrophil count is less than 1.2 on 2 different occasions 1 month apart.  So far her WBC count has remained stable with no ANC below 1.2.  She denies any recurrent infections or B symptoms.  We now continue to monitor her through repeat CBC testing.    Sebaceous cyst  She states that she previously saw a dermatologist for a cyst on her right flank that was lanced and treated but not entirely removed. She states it is still present, but it has not been open, draining, erythematous, or otherwise bothersome.      I reviewed the following    Past Medical History:   Diagnosis Date   • Insomnia    • Other specified symptom associated with female genital organs     abnormal pap, HPV        Past Surgical History:   Procedure Laterality Date   • CERVICAL CONIZATION  3/6/2013    Performed by  "Rona Jacobsen M.D. at SURGERY SAME DAY [facility] ORS   • DILATION AND CURETTAGE      retained placenta       No Known Allergies    Current Outpatient Prescriptions   Medication Sig Dispense Refill   • Coenzyme Q10 (CO Q 10 PO) Take  by mouth.     • Omega-3 Fatty Acids (FISH OIL) 1000 MG Cap capsule Take 1,000 mg by mouth 3 times a day, with meals.     • Cholecalciferol (VITAMIN D PO) Take  by mouth.     • tetanus-dipth-acell pertussis (ADACEL) 5-2-15.5 LF-MCG/0.5 Suspension 0.5 mL by Intramuscular route Once PRN for up to 1 dose. 0.5 mL 0   • BIOTIN PO Take  by mouth.     • Multiple Vitamins-Minerals (MULTIVITAMIN PO) Take 1 Tab by mouth every day.       • Non Formulary Request plexus     • Rhodiola 300 MG Cap Take  by mouth.     • Naproxen Sodium (ALEVE) 220 MG CAPS Take 1 Cap by mouth as needed.    Indications: Mild to Moderate Pain       No current facility-administered medications for this visit.         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       Objective:     /80 (BP Location: Left arm, Patient Position: Sitting, BP Cuff Size: Adult)   Pulse 61   Temp 36.4 °C (97.6 °F) (Temporal)   Ht 1.727 m (5' 8\")   Wt 80.7 kg (177 lb " 14.6 oz)   SpO2 96%   BMI 27.05 kg/m²     Physical Exam   Constitutional: She is oriented to person, place, and time. She appears well-developed and well-nourished. No distress.   HENT:   Head: Normocephalic and atraumatic.   Right Ear: External ear normal.   Left Ear: External ear normal.   Nose: Nose normal.   Mouth/Throat: Oropharynx is clear and moist. No oropharyngeal exudate.   Eyes: Pupils are equal, round, and reactive to light. Conjunctivae and EOM are normal. Right eye exhibits no discharge. Left eye exhibits no discharge. No scleral icterus.   Neck: Normal range of motion. Neck supple. No JVD present. No tracheal deviation present. No thyromegaly present.   Cardiovascular: Normal rate, regular rhythm, normal heart sounds and intact distal pulses.  Exam reveals no gallop and no friction rub.    No murmur heard.  Pulmonary/Chest: Effort normal and breath sounds normal. No stridor. No respiratory distress. She has no wheezes. She has no rales. She exhibits no tenderness.   Abdominal: Soft. Bowel sounds are normal. She exhibits no distension and no mass. There is no tenderness. There is no rebound and no guarding. No hernia.   Musculoskeletal: Normal range of motion. She exhibits no edema, tenderness or deformity.   Lymphadenopathy:     She has no cervical adenopathy.   Neurological: She is alert and oriented to person, place, and time. She has normal reflexes. She displays normal reflexes. No cranial nerve deficit. She exhibits normal muscle tone. Coordination normal.   Skin: Skin is warm and dry. No rash noted. She is not diaphoretic. No erythema. No pallor.   1 cm sebaceous cyst on right flank   Psychiatric: She has a normal mood and affect. Her behavior is normal. Judgment and thought content normal.   Nursing note and vitals reviewed.       Assessment/Plan:     1. Routine physical examination  Complete exam was done aside from GYN/PAP smear which she has done by her OB/GYN, Dr. Jacobsen.  I will get  records from her gynecologist.  Last mammogram earlier this month was normal; she will continue to complete these yearly. There are standing lab orders for a CBC, CMP, and lipid profile from January of this year. She will complete these directly after her visit today as she has been fasting. We will contact her in regards to her results.     2. Screen for colon cancer  Last FIT testing last year was negative. Patient was given another stool testing kit for her to complete this year. She will complete a colonoscopy later this year under her husbands new insurance, and she will contact me when she is ready for the referral.  - OCCULT BLOOD FECES IMMUNOASSAY; Future    3. Need for diphtheria-tetanus-pertussis (Tdap) vaccine  Strongly advised patient to receive her TDAP vaccination. She states she will receive them at her local pharmacy as these are the cheaper option. I have provided her with a prescription for this.  - tetanus-dipth-acell pertussis (ADACEL) 5-2-15.5 LF-MCG/0.5 Suspension; 0.5 mL by Intramuscular route Once PRN for up to 1 dose.  Dispense: 0.5 mL; Refill: 0    4. Sebaceous cyst  Cyst does not appear to require any treatment at this time, and patient states it is not bothersome and would not like to be seen by dermatology again at this time. Advised her to monitor it for changes or signs of infection and she can see me here for treatment if needed.      Ganesh WATERMAN (Scribe), am scribing for, and in the presence of, Uma Carrillo MD     Electronically signed by: Ganesh Castillo (Abhijite), 7/22/2019    IUma MD personally performed the services described in this documentation, as scribed by Ganesh Castillo in my presence, and it is both accurate and complete.

## 2019-07-22 NOTE — LETTER
[facility]  Uma Carrillo M.D.  [address]  [place] [address]  Fax: [phone]   Authorization for Release/Disclosure of   Protected Health Information   Name: CARMEN RAMIREZ : 1955 SSN: [id number]   Address: [facility] [place] [address] Phone:    [phone] (home)    I authorize the entity listed below to release/disclose the PHI below to:   [facility]/Uma Carrillo M.D. and Uma Carrillo M.D.   Provider or Entity Name:    Dr. Rona Jacobsen - Gyn   Address   City, [facility]   Phone:      Fax:     Reason for request: continuity of care   Information to be released:    [  ] LAST COLONOSCOPY,  including any PATH REPORT and follow-up  [  ] LAST FIT/COLOGUARD RESULT [  ] LAST DEXA  [  ] LAST MAMMOGRAM  [ x ] LAST PAP  [  ] LAST LABS [  ] RETINA EXAM REPORT  [  ] IMMUNIZATION RECORDS  [  ] Release all info      [  ] Check here and initial the line next to each item to release ALL health information INCLUDING  _____ Care and treatment for drug and / or alcohol abuse  _____ HIV testing, infection status, or AIDS  _____ Genetic Testing    DATES OF SERVICE OR TIME PERIOD TO BE DISCLOSED: _____________  I understand and acknowledge that:  * This Authorization may be revoked at any time by you in writing, except if your health information has already been used or disclosed.  * Your health information that will be used or disclosed as a result of you signing this authorization could be re-disclosed by the recipient. If this occurs, your re-disclosed health information may no longer be protected by State or Federal laws.  * You may refuse to sign this Authorization. Your refusal will not affect your ability to obtain treatment.  * This Authorization becomes effective upon signing and will  on (date) __________.      If no date is indicated, this Authorization will  one (1) year from the signature date.    Name: Carmen Ramirez    Signature:   Date:     2019            PLEASE FAX REQUESTED RECORDS BACK TO: [phone]

## 2019-07-23 LAB
ALBUMIN SERPL BCP-MCNC: 4 G/DL (ref 3.2–4.9)
ALBUMIN/GLOB SERPL: 1.5 G/DL
ALP SERPL-CCNC: 95 U/L (ref 30–99)
ALT SERPL-CCNC: 36 U/L (ref 2–50)
ANION GAP SERPL CALC-SCNC: 8 MMOL/L (ref 0–11.9)
AST SERPL-CCNC: 30 U/L (ref 12–45)
BILIRUB SERPL-MCNC: 0.5 MG/DL (ref 0.1–1.5)
BUN SERPL-MCNC: 14 MG/DL (ref 8–22)
CALCIUM SERPL-MCNC: 9.7 MG/DL (ref 8.5–10.5)
CHLORIDE SERPL-SCNC: 111 MMOL/L (ref 96–112)
CHOLEST SERPL-MCNC: 177 MG/DL (ref 100–199)
CO2 SERPL-SCNC: 25 MMOL/L (ref 20–33)
CREAT SERPL-MCNC: 0.73 MG/DL (ref 0.5–1.4)
FASTING STATUS PATIENT QL REPORTED: NORMAL
GLOBULIN SER CALC-MCNC: 2.6 G/DL (ref 1.9–3.5)
GLUCOSE SERPL-MCNC: 82 MG/DL (ref 65–99)
HDLC SERPL-MCNC: 80 MG/DL
LDLC SERPL CALC-MCNC: 89 MG/DL
POTASSIUM SERPL-SCNC: 3.8 MMOL/L (ref 3.6–5.5)
PROT SERPL-MCNC: 6.6 G/DL (ref 6–8.2)
SODIUM SERPL-SCNC: 144 MMOL/L (ref 135–145)
TRIGL SERPL-MCNC: 39 MG/DL (ref 0–149)

## 2020-07-16 ENCOUNTER — OFFICE VISIT (OUTPATIENT)
Dept: MEDICAL GROUP | Facility: PHYSICIAN GROUP | Age: 65
End: 2020-07-16
Payer: COMMERCIAL

## 2020-07-16 ENCOUNTER — HOSPITAL ENCOUNTER (OUTPATIENT)
Dept: LAB | Facility: MEDICAL CENTER | Age: 65
End: 2020-07-16
Attending: FAMILY MEDICINE
Payer: COMMERCIAL

## 2020-07-16 VITALS
BODY MASS INDEX: 28.97 KG/M2 | HEART RATE: 65 BPM | DIASTOLIC BLOOD PRESSURE: 60 MMHG | WEIGHT: 191.14 LBS | OXYGEN SATURATION: 97 % | HEIGHT: 68 IN | TEMPERATURE: 97.7 F | SYSTOLIC BLOOD PRESSURE: 110 MMHG

## 2020-07-16 DIAGNOSIS — R20.2 NUMBNESS AND TINGLING OF FOOT: ICD-10-CM

## 2020-07-16 DIAGNOSIS — Z12.11 SCREEN FOR COLON CANCER: ICD-10-CM

## 2020-07-16 DIAGNOSIS — R20.0 NUMBNESS AND TINGLING OF FOOT: ICD-10-CM

## 2020-07-16 DIAGNOSIS — D70.9 NEUTROPENIA, UNSPECIFIED TYPE (HCC): ICD-10-CM

## 2020-07-16 DIAGNOSIS — Z12.39 SCREENING FOR BREAST CANCER: ICD-10-CM

## 2020-07-16 DIAGNOSIS — Z11.59 NEED FOR HEPATITIS C SCREENING TEST: ICD-10-CM

## 2020-07-16 DIAGNOSIS — Z00.00 ROUTINE PHYSICAL EXAMINATION: ICD-10-CM

## 2020-07-16 LAB
ALBUMIN SERPL BCP-MCNC: 4.5 G/DL (ref 3.2–4.9)
ALBUMIN/GLOB SERPL: 2 G/DL
ALP SERPL-CCNC: 95 U/L (ref 30–99)
ALT SERPL-CCNC: 24 U/L (ref 2–50)
ANION GAP SERPL CALC-SCNC: 10 MMOL/L (ref 7–16)
AST SERPL-CCNC: 26 U/L (ref 12–45)
BASOPHILS # BLD AUTO: 0.9 % (ref 0–1.8)
BASOPHILS # BLD: 0.03 K/UL (ref 0–0.12)
BILIRUB SERPL-MCNC: 0.6 MG/DL (ref 0.1–1.5)
BUN SERPL-MCNC: 12 MG/DL (ref 8–22)
CALCIUM SERPL-MCNC: 9.9 MG/DL (ref 8.5–10.5)
CHLORIDE SERPL-SCNC: 107 MMOL/L (ref 96–112)
CHOLEST SERPL-MCNC: 194 MG/DL (ref 100–199)
CO2 SERPL-SCNC: 26 MMOL/L (ref 20–33)
CREAT SERPL-MCNC: 0.63 MG/DL (ref 0.5–1.4)
EOSINOPHIL # BLD AUTO: 0.15 K/UL (ref 0–0.51)
EOSINOPHIL NFR BLD: 4.3 % (ref 0–6.9)
ERYTHROCYTE [DISTWIDTH] IN BLOOD BY AUTOMATED COUNT: 45.4 FL (ref 35.9–50)
FASTING STATUS PATIENT QL REPORTED: NORMAL
GLOBULIN SER CALC-MCNC: 2.3 G/DL (ref 1.9–3.5)
GLUCOSE SERPL-MCNC: 82 MG/DL (ref 65–99)
HCT VFR BLD AUTO: 41.5 % (ref 37–47)
HCV AB SER QL: NORMAL
HDLC SERPL-MCNC: 93 MG/DL
HGB BLD-MCNC: 13.4 G/DL (ref 12–16)
IMM GRANULOCYTES # BLD AUTO: 0.01 K/UL (ref 0–0.11)
IMM GRANULOCYTES NFR BLD AUTO: 0.3 % (ref 0–0.9)
LDLC SERPL CALC-MCNC: 90 MG/DL
LYMPHOCYTES # BLD AUTO: 1.36 K/UL (ref 1–4.8)
LYMPHOCYTES NFR BLD: 38.7 % (ref 22–41)
MCH RBC QN AUTO: 32.1 PG (ref 27–33)
MCHC RBC AUTO-ENTMCNC: 32.3 G/DL (ref 33.6–35)
MCV RBC AUTO: 99.3 FL (ref 81.4–97.8)
MONOCYTES # BLD AUTO: 0.27 K/UL (ref 0–0.85)
MONOCYTES NFR BLD AUTO: 7.7 % (ref 0–13.4)
NEUTROPHILS # BLD AUTO: 1.69 K/UL (ref 2–7.15)
NEUTROPHILS NFR BLD: 48.1 % (ref 44–72)
NRBC # BLD AUTO: 0 K/UL
NRBC BLD-RTO: 0 /100 WBC
PLATELET # BLD AUTO: 195 K/UL (ref 164–446)
PMV BLD AUTO: 10.6 FL (ref 9–12.9)
POTASSIUM SERPL-SCNC: 4.2 MMOL/L (ref 3.6–5.5)
PROT SERPL-MCNC: 6.8 G/DL (ref 6–8.2)
RBC # BLD AUTO: 4.18 M/UL (ref 4.2–5.4)
SODIUM SERPL-SCNC: 143 MMOL/L (ref 135–145)
TRIGL SERPL-MCNC: 53 MG/DL (ref 0–149)
TSH SERPL DL<=0.005 MIU/L-ACNC: 2.35 UIU/ML (ref 0.38–5.33)
VIT B12 SERPL-MCNC: 427 PG/ML (ref 211–911)
WBC # BLD AUTO: 3.5 K/UL (ref 4.8–10.8)

## 2020-07-16 PROCEDURE — 80053 COMPREHEN METABOLIC PANEL: CPT

## 2020-07-16 PROCEDURE — 82607 VITAMIN B-12: CPT

## 2020-07-16 PROCEDURE — 36415 COLL VENOUS BLD VENIPUNCTURE: CPT

## 2020-07-16 PROCEDURE — 82746 ASSAY OF FOLIC ACID SERUM: CPT

## 2020-07-16 PROCEDURE — 99396 PREV VISIT EST AGE 40-64: CPT | Performed by: FAMILY MEDICINE

## 2020-07-16 PROCEDURE — 84443 ASSAY THYROID STIM HORMONE: CPT

## 2020-07-16 PROCEDURE — 80061 LIPID PANEL: CPT

## 2020-07-16 PROCEDURE — 86803 HEPATITIS C AB TEST: CPT

## 2020-07-16 PROCEDURE — 85025 COMPLETE CBC W/AUTO DIFF WBC: CPT

## 2020-07-16 ASSESSMENT — PATIENT HEALTH QUESTIONNAIRE - PHQ9: CLINICAL INTERPRETATION OF PHQ2 SCORE: 0

## 2020-07-16 ASSESSMENT — FIBROSIS 4 INDEX: FIB4 SCORE: 1.55

## 2020-07-16 NOTE — PROGRESS NOTES
Subjective:      Carmen Ramirez is a 64 y.o. female who presents with Annual Exam            HPI     Patient is here for routine physical exam.  She said she had her GYN exam/Pap smear done by Dr. Jacobsen already this year.  She is due for mammogram.  Her last Tdap was in September 2019.  The last flu shot was in October 2019.  She does not want to do colonoscopy at this time and would like to wait until she gets Medicare.  We have ordered a fit test which she has not done yet.    We follow her for chronic mild neutropenia.  She has been seen and evaluated by Dr. Cardona, hematologist.  Dr. Cardona recommended referral back to him if her ANC drops below 1.2k on 2 occasions.  Patient remains asymptomatic and her numbers have been stable.    She is complaining of 6 to 8 months of numbness of the toes of both feet usually noted at night when she is already about to go to bed.  She does not seem to notice this during the day when she is busy or doing her ADLs.    In the interim, she had cyst removed from her back by the dermatologist that came back it was benign.    I reviewed the following    Past Medical History:   Diagnosis Date   • Insomnia    • Other specified symptom associated with female genital organs     abnormal pap, HPV        Past Surgical History:   Procedure Laterality Date   • CERVICAL CONIZATION  3/6/2013    Performed by Rona Jacobsen M.D. at SURGERY SAME DAY [facility]   • DILATION AND CURETTAGE      retained placenta       No Known Allergies    Current Outpatient Medications   Medication Sig Dispense Refill   • Coenzyme Q10 (CO Q 10 PO) Take  by mouth.     • Cholecalciferol (VITAMIN D PO) Take  by mouth.     • BIOTIN PO Take  by mouth.     • Multiple Vitamins-Minerals (MULTIVITAMIN PO) Take 1 Tab by mouth every day.       • Naproxen Sodium (ALEVE) 220 MG CAPS Take 1 Cap by mouth as needed.    Indications: Mild to Moderate Pain     • tetanus-dipth-acell pertussis (ADACEL) 5-2-15.5 LF-MCG/0.5  Suspension 0.5 mL by Intramuscular route Once PRN for up to 1 dose. 0.5 mL 0     No current facility-administered medications for this visit.         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     Employer: not working   Social Needs   • Financial resource strain: Not on file   • Food insecurity     Worry: Not on file     Inability: Not on file   • Transportation needs     Medical: Not on file     Non-medical: Not on file   Tobacco Use   • Smoking status: Never Smoker   • Smokeless tobacco: Never Used   Substance and Sexual Activity   • Alcohol use: Yes     Alcohol/week: 0.0 oz     Comment: weekends   • Drug use: No   • Sexual activity: Yes     Partners: Male   Lifestyle   • Physical activity     Days per week: Not on file     Minutes per session: Not on file   • Stress: Not on file   Relationships   • Social connections     Talks on phone: Not on file     Gets together: Not on file     Attends Anabaptist service: Not on file     Active member of club or organization: Not on file     Attends meetings of clubs or organizations: Not on file     Relationship status: Not on file   • Intimate partner violence     Fear of current or ex partner: Not on file     Emotionally abused: Not on file     Physically abused: Not on file     Forced sexual activity: Not on file   Other Topics Concern   • Not on file   Social History Narrative   • Not on file        ROS  "    As per HPI, the rest are negative.       Objective:     /60 (BP Location: Left arm, Patient Position: Sitting, BP Cuff Size: Adult)   Pulse 65   Temp 36.5 °C (97.7 °F) (Temporal)   Ht 1.727 m (5' 8\")   Wt 86.7 kg (191 lb 2.2 oz)   SpO2 97%   BMI 29.06 kg/m²      Physical Exam  Vitals signs and nursing note reviewed.   Constitutional:       General: She is not in acute distress.     Appearance: She is well-developed. She is not diaphoretic.   HENT:      Head: Normocephalic and atraumatic.      Right Ear: External ear normal.      Left Ear: External ear normal.      Nose: Nose normal.      Mouth/Throat:      Pharynx: No oropharyngeal exudate.   Eyes:      General: No scleral icterus.        Right eye: No discharge.         Left eye: No discharge.      Conjunctiva/sclera: Conjunctivae normal.      Pupils: Pupils are equal, round, and reactive to light.   Neck:      Musculoskeletal: Normal range of motion and neck supple.      Thyroid: No thyromegaly.      Vascular: No JVD.      Trachea: No tracheal deviation.   Cardiovascular:      Rate and Rhythm: Normal rate and regular rhythm.      Heart sounds: Normal heart sounds. No murmur. No friction rub. No gallop.    Pulmonary:      Effort: Pulmonary effort is normal. No respiratory distress.      Breath sounds: Normal breath sounds. No stridor. No wheezing or rales.   Chest:      Chest wall: No tenderness.   Abdominal:      General: Bowel sounds are normal. There is no distension.      Palpations: Abdomen is soft. There is no mass.      Tenderness: There is no abdominal tenderness. There is no guarding or rebound.      Hernia: No hernia is present.   Musculoskeletal: Normal range of motion.         General: No tenderness or deformity.   Lymphadenopathy:      Cervical: No cervical adenopathy.   Skin:     General: Skin is warm and dry.      Coloration: Skin is not pale.      Findings: No erythema or rash.   Neurological:      Mental Status: She is alert and " oriented to person, place, and time.      Cranial Nerves: No cranial nerve deficit.      Motor: No abnormal muscle tone.      Coordination: Coordination normal.      Deep Tendon Reflexes: Reflexes are normal and symmetric. Reflexes normal.   Psychiatric:         Behavior: Behavior normal.         Thought Content: Thought content normal.         Judgment: Judgment normal.                      Assessment/Plan:       1. Routine physical examination  Complete exam done except for GYN exam/Pap smear which was already done by her gynecologist this year.  We will get the records.  She is due for mammogram and this was ordered.  Up-to-date with Tdap and flu shot.  She is due for Shingrix and she was advised to get it from local drugstore since this is not available in our office.  We will send her for screening labs.  - Lipid Profile; Future  - Comp Metabolic Panel; Future  - CBC WITH DIFFERENTIAL; Future  - TSH; Future  - HEP C VIRUS ANTIBODY; Future    2. Neutropenia, unspecified type (HCC)  We will do follow-up CBC.  - Lipid Profile; Future  - Comp Metabolic Panel; Future  - CBC WITH DIFFERENTIAL; Future  - TSH; Future  - HEP C VIRUS ANTIBODY; Future    3. Numbness and tingling of foot  I discussed with patient that this may be neuropathy that she is having.  To confirm the diagnosis we will have to do a nerve testing which is the EMG.  In the meantime we can check for vitamin B12 and folate levels to make sure this is not due to deficiency of these vitamins.  We will also check TSH.  She wants to hold off on further work-up with EMG at this time.  I advised the patient to let me know if the problem gets worse especially if it spreads to the whole foot and if it goes up to the leg.  - VITAMIN B12; Future  - FOLATE; Future    4. Need for hepatitis C screening test  She qualifies for hepatitis C screening based on CDC guidelines and this was ordered.  She  - Lipid Profile; Future  - Comp Metabolic Panel; Future  - CBC WITH  DIFFERENTIAL; Future  - TSH; Future  - HEP C VIRUS ANTIBODY; Future    5. Screening for breast cancer  We will schedule her screening mammogram because she is overdue.  - MA-SCREENING MAMMO BILAT W/CAD; Future    6. Screen for colon cancer  She wants to hold off on colonoscopy until she gets Medicare insurance.  She agrees to do the FIT.  The previous kit that I gave her already  and new kit was given  - OCCULT BLOOD FECES IMMUNOASSAY; Future    She will return yearly for physical exam or sooner if needed.      Please note that this dictation was created using voice recognition software. I have worked with consultants from the vendor as well as technical experts from [facility] to optimize the interface. I have made every reasonable attempt to correct obvious errors, but I expect that there are errors of grammar and possibly content I did not discover before finalizing the note.

## 2020-07-16 NOTE — LETTER
[facility]  Uma Carrillo M.D.  [address]  [place] [address]  Fax: [phone]   Authorization for Release/Disclosure of   Protected Health Information   Name: CARMEN RAMIREZ : 1955 SSN: [id number]   Address: [facility] [place] [address] Phone:    [phone] (home)    I authorize the entity listed below to release/disclose the PHI below to:   [facility]/Uma Carrillo M.D. and Uma Carrillo M.D.   Provider or Entity Name:    Dr. Jacobsen - gyn   Address   City, [facility]   Phone:      Fax:     Reason for request: continuity of care   Information to be released:    [  ] LAST COLONOSCOPY,  including any PATH REPORT and follow-up  [  ] LAST FIT/COLOGUARD RESULT [  ] LAST DEXA  [  ] LAST MAMMOGRAM  [x  ] LAST PAP  [  ] LAST LABS [  ] RETINA EXAM REPORT  [  ] IMMUNIZATION RECORDS  [  ] Release all info      [  ] Check here and initial the line next to each item to release ALL health information INCLUDING  _____ Care and treatment for drug and / or alcohol abuse  _____ HIV testing, infection status, or AIDS  _____ Genetic Testing    DATES OF SERVICE OR TIME PERIOD TO BE DISCLOSED: _____________  I understand and acknowledge that:  * This Authorization may be revoked at any time by you in writing, except if your health information has already been used or disclosed.  * Your health information that will be used or disclosed as a result of you signing this authorization could be re-disclosed by the recipient. If this occurs, your re-disclosed health information may no longer be protected by State or Federal laws.  * You may refuse to sign this Authorization. Your refusal will not affect your ability to obtain treatment.  * This Authorization becomes effective upon signing and will  on (date) __________.      If no date is indicated, this Authorization will  one (1) year from the signature date.    Name: Carmen Ramirez    Signature:   Date:     2020       PLEASE FAX  REQUESTED RECORDS BACK TO: [phone]

## 2020-07-17 ENCOUNTER — HOSPITAL ENCOUNTER (OUTPATIENT)
Facility: MEDICAL CENTER | Age: 65
End: 2020-07-17
Attending: FAMILY MEDICINE
Payer: COMMERCIAL

## 2020-07-17 DIAGNOSIS — Z12.11 SCREEN FOR COLON CANCER: ICD-10-CM

## 2020-07-17 LAB — FOLATE SERPL-MCNC: 25.8 NG/ML

## 2020-07-17 PROCEDURE — 82274 ASSAY TEST FOR BLOOD FECAL: CPT

## 2020-07-18 LAB — HEMOCCULT STL QL IA: NEGATIVE

## 2020-07-21 ENCOUNTER — HOSPITAL ENCOUNTER (OUTPATIENT)
Dept: RADIOLOGY | Facility: MEDICAL CENTER | Age: 65
End: 2020-07-21
Attending: FAMILY MEDICINE
Payer: COMMERCIAL

## 2020-07-21 DIAGNOSIS — Z12.31 VISIT FOR SCREENING MAMMOGRAM: ICD-10-CM

## 2020-07-21 PROCEDURE — 77067 SCR MAMMO BI INCL CAD: CPT

## 2021-03-03 DIAGNOSIS — Z23 NEED FOR VACCINATION: ICD-10-CM

## 2021-07-20 ENCOUNTER — APPOINTMENT (OUTPATIENT)
Dept: MEDICAL GROUP | Facility: PHYSICIAN GROUP | Age: 66
End: 2021-07-20
Payer: MEDICARE

## 2021-08-03 ENCOUNTER — OFFICE VISIT (OUTPATIENT)
Dept: MEDICAL GROUP | Facility: PHYSICIAN GROUP | Age: 66
End: 2021-08-03
Payer: MEDICARE

## 2021-08-03 VITALS
OXYGEN SATURATION: 98 % | HEIGHT: 69 IN | DIASTOLIC BLOOD PRESSURE: 60 MMHG | HEART RATE: 70 BPM | WEIGHT: 167.55 LBS | TEMPERATURE: 98.1 F | SYSTOLIC BLOOD PRESSURE: 108 MMHG | BODY MASS INDEX: 24.82 KG/M2

## 2021-08-03 DIAGNOSIS — R20.0 NUMBNESS AND TINGLING OF FOOT: ICD-10-CM

## 2021-08-03 DIAGNOSIS — Z00.00 MEDICARE ANNUAL WELLNESS VISIT, INITIAL: ICD-10-CM

## 2021-08-03 DIAGNOSIS — Z78.0 POSTMENOPAUSAL: ICD-10-CM

## 2021-08-03 DIAGNOSIS — D70.9 NEUTROPENIA, UNSPECIFIED TYPE (HCC): ICD-10-CM

## 2021-08-03 DIAGNOSIS — R20.2 NUMBNESS AND TINGLING OF FOOT: ICD-10-CM

## 2021-08-03 PROCEDURE — G0438 PPPS, INITIAL VISIT: HCPCS | Performed by: FAMILY MEDICINE

## 2021-08-03 ASSESSMENT — FIBROSIS 4 INDEX: FIB4 SCORE: 1.769075925343406405

## 2021-08-03 ASSESSMENT — ENCOUNTER SYMPTOMS: GENERAL WELL-BEING: EXCELLENT

## 2021-08-03 ASSESSMENT — PATIENT HEALTH QUESTIONNAIRE - PHQ9: CLINICAL INTERPRETATION OF PHQ2 SCORE: 0

## 2021-08-03 ASSESSMENT — ACTIVITIES OF DAILY LIVING (ADL): BATHING_REQUIRES_ASSISTANCE: 0

## 2021-08-03 NOTE — PROGRESS NOTES
Chief Complaint   Patient presents with   • Annual Exam     AWV         HPI:  Carmen is a 65 y.o. here for Medicare Annual Wellness Visit        Patient Active Problem List    Diagnosis Date Noted   • Insomnia        Current Outpatient Medications   Medication Sig Dispense Refill   • Multiple Vitamins-Minerals (ZINC PO) Take  by mouth.     • Cholecalciferol (VITAMIN D PO) Take  by mouth.     • BIOTIN PO Take  by mouth.     • Multiple Vitamins-Minerals (MULTIVITAMIN PO) Take 1 Tab by mouth every day.       • Naproxen Sodium (ALEVE) 220 MG CAPS Take 1 Cap by mouth as needed.    Indications: Mild to Moderate Pain     • Coenzyme Q10 (CO Q 10 PO) Take  by mouth. (Patient not taking: Reported on 8/3/2021)     • tetanus-dipth-acell pertussis (ADACEL) 5-2-15.5 LF-MCG/0.5 Suspension 0.5 mL by Intramuscular route Once PRN for up to 1 dose. 0.5 mL 0     No current facility-administered medications for this visit.        Patient is taking medications as noted in medication list.  Current supplements as per medication list.     Allergies: Patient has no known allergies.    Current social contact/activities: [employer], QUILTING, WALKING, HIKING, EATING OUT WITH FRIENDS, TRAVELS, READS BOOKS       Is patient current with immunizations? Yes.    She  reports that she has never smoked. She has never used smokeless tobacco. She reports current alcohol use. She reports that she does not use drugs.  Counseling given: Not Answered        DPA/Advanced directive: Patient does not have an Advanced Directive.  A packet and workshop information was given on Advanced Directives.    ROS:    Gait: Uses no assistive device   Ostomy: No   Other tubes: No   Amputations: No   Chronic oxygen use No   Last eye exam 2021   Wears hearing aids: No   : Denies any urinary leakage during the last 6 months      Screening:        Depression Screening    Little interest or pleasure in doing things?  0 - not at all  Feeling down, depressed, or  hopeless? 0 - not at all  Patient Health Questionnaire Score: 0    If depressive symptoms identified deferred to follow up visit unless specifically addressed in assessment and plan.    Interpretation of PHQ-9 Total Score   Score Severity   1-4 No Depression   5-9 Mild Depression   10-14 Moderate Depression   15-19 Moderately Severe Depression   20-27 Severe Depression    Screening for Cognitive Impairment    Three Minute Recall (captain, garden, picture)  3/3    Haroon clock face with all 12 numbers and set the hands to show 5 past 8.  Yes    If cognitive concerns identified, deferred for follow up unless specifically addressed in assessment and plan.    Fall Risk Assessment    Has the patient had two or more falls in the last year or any fall with injury in the last year?  No  If fall risk identified, deferred for follow up unless specifically addressed in assessment and plan.    Safety Assessment    Throw rugs on floor.  No  Handrails on all stairs.  No  Good lighting in all hallways.  Yes  Difficulty hearing.  No  Patient counseled about all safety risks that were identified.    Functional Assessment ADLs    Are there any barriers preventing you from cooking for yourself or meeting nutritional needs?  No.    Are there any barriers preventing you from driving safely or obtaining transportation?  No.    Are there any barriers preventing you from using a telephone or calling for help?  No.    Are there any barriers preventing you from shopping?  No.    Are there any barriers preventing you from taking care of your own finances?  No.    Are there any barriers preventing you from managing your medications?  No.    Are there any barriers preventing you from showering, bathing or dressing yourself?  No.    Are you currently engaging in any exercise or physical activity?  Yes.     What is your perception of your health?  Excellent.    Health Maintenance Summary                COVID-19 Vaccine Overdue 8/8/1967     IMM  ZOSTER VACCINES Overdue 8/8/2005     BONE DENSITY Overdue 8/8/2020     IMM PNEUMOCOCCAL VACCINE: 65+ Years Overdue 8/8/2020     MAMMOGRAM Overdue 7/21/2021      Done 7/21/2020 MA-SCREENING MAMMO BILAT W/TOMOSYNTHESIS W/CAD     Patient has more history with this topic...    IMM INFLUENZA Next Due 9/1/2021      Done 10/15/2019 Imm Admin: Influenza Vaccine Quad Inj (Pf)     Patient has more history with this topic...    PAP SMEAR Next Due 5/29/2022      Done 5/29/2019 PAP IG (IMAGE GUIDED)     Patient has more history with this topic...    IMM DTaP/Tdap/Td Vaccine Next Due 9/24/2029      Done 9/24/2019 Imm Admin: Tdap Vaccine    COLONOSCOPY Next Due 3/15/2031      Done 3/15/2021 REFERRAL TO GI FOR COLONOSCOPY          Patient Care Team:  Uma Carrillo M.D. as PCP - General (Family Medicine)  [facility] (Inactive) as Consulting Physician    Social History     Tobacco Use   • Smoking status: Never Smoker   • Smokeless tobacco: Never Used   Vaping Use   • Vaping Use: Never used   Substance Use Topics   • Alcohol use: Yes     Alcohol/week: 0.0 oz     Comment: weekends   • Drug use: No     Family History   Problem Relation Age of Onset   • Hypertension Mother    • Thyroid Mother    • Arthritis Mother         spine - scoliosis   • GI Disease Mother         GERD   • Diabetes Father    • Hypertension Father    • Lung Disease Father         COPD   • Thyroid Father    • Cancer Brother         malignant melanoma     She  has a past medical history of Insomnia and Other specified symptom associated with female genital organs. She also has no past medical history of Breast cancer (HCC).   Past Surgical History:   Procedure Laterality Date   • CERVICAL CONIZATION  3/6/2013    Performed by Rona Jacobsen M.D. at SURGERY SAME DAY [facility]   • DILATION AND CURETTAGE      retained placenta           Exam:     /60 (BP Location: Left arm, Patient Position: Sitting, BP Cuff Size: Adult)   Pulse 70   Temp 36.7 °C  "(98.1 °F) (Temporal)   Ht 1.753 m (5' 9\")   Wt 76 kg (167 lb 8.8 oz)   SpO2 98%  Body mass index is 24.74 kg/m².    Hearing excellent.    Dentition good  Alert, oriented in no acute distress.  Eye contact is good, speech goal directed, affect calm  Constitutional:  Alert, awake, oriented, not in distress    Skin:                 Warm, no rashes in visible areas    Head:               Atraumatic, normocephalic    Eyes:               Pupils equal, round and reactive to light, extraocular muscles movement intact, clear conjunctivae    Ears:               Tympanic membranes intact without evidence of infection    Nose:              No nasal discharge, no obstruction    Mouth:             No oral lesions    Throat:            Tonsils not enlarged, no exudates    Neck:              Trachea midline, supple, no lymphadenopathy, no thyromegaly    Lungs:             Clear to auscultation, no rales, no wheezes, no rhonchi    Heart:              Regular rate and rhythm, no murmur    Abdomen:       Good bowel sounds, soft, nontender, no hepatosplenomegaly, no masses    Extremities:    No edema, no clubbing, no cyanosis    Psych:            Alert and oriented x3, normal affect    Neuro:            Cranial nerves intact, Motor strength 5/5 upper and lower extremities, DTRs 2+, sensation intact to light touch, negative Romberg    Assessment and Plan. The following treatment and monitoring plan is recommended:     1. Medicare annual wellness visit, initial  Up-to-date with Tdap.  She is refusing all other shots including pneumonia shots, Shingrix, flu shot..  She said she is hesitant on getting the COVID-19 shot and would like to hold off on this.  Up-to-date with colonoscopy.  The last mammogram was in July 2020 without any evidence of malignancy.  She wants to do screening mammogram every 2 years.  No history of abnormal mammogram or history of breast masses requiring biopsy.  No history of breast cancer in the immediate " family.  We will switch to every other year screening mammogram.  Up-to-date with colonoscopy.  She sees her gynecologist for GYN exam/Pap smear.  She said her last 1 came back unsatisfactory due to insufficient cells and so this will be repeated this month.  No evidence of dementia, depression, risk of falls on screening.  We will do screening labs.    2. Neutropenia, unspecified type (HCC)  This is a chronic problem.  This is mild and stable over the years.  She has already been seen and evaluated by Dr. Soler, hematologist/oncologist who did not find any etiology for the neutropenia and recommended to refer her back if the ANC is below 1.2K on 2 occasions.  So far her we have not seen any level below this.  We will continue to follow.  We will do updated blood work to check the CBC.    3. Numbness and tingling of foot  She complained of numbness of tingling of the toes of both feet since a year ago.  Her blood work came back normal TSH, vitamins B12 and folate levels.  She said there has not been any worsening of the problem.  At this time she does not want to proceed with additional work-up.    4. Postmenopausal  We discussed doing bone density scan for screening purposes and she agrees to proceed and she will schedule it.      Services suggested: No services needed at this time  Health Care Screening recommendations as per orders if indicated.  Referrals offered: PT/OT/Nutrition counseling/Behavioral Health/Smoking cessation as per orders if indicated.    Discussion today about general wellness and lifestyle habits:    · Prevent falls and reduce trip hazards; Cautioned about securing or removing rugs.  · Have a working fire alarm and carbon monoxide detector;   · Engage in regular physical activity and social activities       Follow-up: Follow-up yearly or sooner if needed.      Please note that this dictation was created using voice recognition software. I have worked with consultants from the vendor as well  as technical experts from [facility] to optimize the interface. I have made every reasonable attempt to correct obvious errors, but I expect that there are errors of grammar and possibly content I did not discover before finalizing the note.

## 2021-08-04 RX ORDER — ESTRADIOL 10 UG/1
INSERT VAGINAL
COMMUNITY
Start: 2021-06-21

## 2021-08-19 LAB
ALBUMIN SERPL-MCNC: 4.5 G/DL (ref 3.8–4.8)
ALBUMIN/GLOB SERPL: 2 {RATIO} (ref 1.2–2.2)
ALP SERPL-CCNC: 108 IU/L (ref 48–121)
ALT SERPL-CCNC: 21 IU/L (ref 0–32)
AST SERPL-CCNC: 18 IU/L (ref 0–40)
BASOPHILS # BLD AUTO: 0 X10E3/UL (ref 0–0.2)
BASOPHILS NFR BLD AUTO: 1 %
BILIRUB SERPL-MCNC: 0.5 MG/DL (ref 0–1.2)
BUN SERPL-MCNC: 12 MG/DL (ref 8–27)
BUN/CREAT SERPL: 16 (ref 12–28)
CALCIUM SERPL-MCNC: 10.1 MG/DL (ref 8.7–10.3)
CHLORIDE SERPL-SCNC: 108 MMOL/L (ref 96–106)
CHOLEST SERPL-MCNC: 199 MG/DL (ref 100–199)
CO2 SERPL-SCNC: 22 MMOL/L (ref 20–29)
CREAT SERPL-MCNC: 0.74 MG/DL (ref 0.57–1)
EOSINOPHIL # BLD AUTO: 0.1 X10E3/UL (ref 0–0.4)
EOSINOPHIL NFR BLD AUTO: 3 %
ERYTHROCYTE [DISTWIDTH] IN BLOOD BY AUTOMATED COUNT: 12.3 % (ref 11.7–15.4)
GLOBULIN SER CALC-MCNC: 2.3 G/DL (ref 1.5–4.5)
GLUCOSE SERPL-MCNC: 91 MG/DL (ref 65–99)
HCT VFR BLD AUTO: 39.8 % (ref 34–46.6)
HDLC SERPL-MCNC: 86 MG/DL
HGB BLD-MCNC: 12.9 G/DL (ref 11.1–15.9)
IMM GRANULOCYTES # BLD AUTO: 0 X10E3/UL (ref 0–0.1)
IMM GRANULOCYTES NFR BLD AUTO: 0 %
IMMATURE CELLS  115398: ABNORMAL
LABORATORY COMMENT REPORT: NORMAL
LDLC SERPL CALC-MCNC: 99 MG/DL (ref 0–99)
LYMPHOCYTES # BLD AUTO: 1.3 X10E3/UL (ref 0.7–3.1)
LYMPHOCYTES NFR BLD AUTO: 40 %
MCH RBC QN AUTO: 30.6 PG (ref 26.6–33)
MCHC RBC AUTO-ENTMCNC: 32.4 G/DL (ref 31.5–35.7)
MCV RBC AUTO: 94 FL (ref 79–97)
MONOCYTES # BLD AUTO: 0.3 X10E3/UL (ref 0.1–0.9)
MONOCYTES NFR BLD AUTO: 8 %
MORPHOLOGY BLD-IMP: ABNORMAL
NEUTROPHILS # BLD AUTO: 1.5 X10E3/UL (ref 1.4–7)
NEUTROPHILS NFR BLD AUTO: 48 %
NRBC BLD AUTO-RTO: ABNORMAL %
PLATELET # BLD AUTO: 216 X10E3/UL (ref 150–450)
POTASSIUM SERPL-SCNC: 4 MMOL/L (ref 3.5–5.2)
PROT SERPL-MCNC: 6.8 G/DL (ref 6–8.5)
RBC # BLD AUTO: 4.22 X10E6/UL (ref 3.77–5.28)
SODIUM SERPL-SCNC: 143 MMOL/L (ref 134–144)
TRIGL SERPL-MCNC: 81 MG/DL (ref 0–149)
TSH SERPL DL<=0.005 MIU/L-ACNC: 1.84 UIU/ML (ref 0.45–4.5)
VLDLC SERPL CALC-MCNC: 14 MG/DL (ref 5–40)
WBC # BLD AUTO: 3.2 X10E3/UL (ref 3.4–10.8)

## 2021-09-22 ENCOUNTER — APPOINTMENT (OUTPATIENT)
Dept: RADIOLOGY | Facility: MEDICAL CENTER | Age: 66
End: 2021-09-22
Attending: FAMILY MEDICINE
Payer: MEDICARE

## 2021-10-01 ENCOUNTER — APPOINTMENT (OUTPATIENT)
Dept: RADIOLOGY | Facility: MEDICAL CENTER | Age: 66
End: 2021-10-01
Attending: FAMILY MEDICINE
Payer: MEDICARE

## 2021-10-13 ENCOUNTER — APPOINTMENT (OUTPATIENT)
Dept: RADIOLOGY | Facility: MEDICAL CENTER | Age: 66
End: 2021-10-13
Attending: FAMILY MEDICINE
Payer: MEDICARE

## 2021-11-10 ENCOUNTER — HOSPITAL ENCOUNTER (OUTPATIENT)
Dept: RADIOLOGY | Facility: MEDICAL CENTER | Age: 66
End: 2021-11-10
Attending: FAMILY MEDICINE
Payer: MEDICARE

## 2021-11-10 DIAGNOSIS — Z78.0 POSTMENOPAUSAL: ICD-10-CM

## 2021-11-10 PROCEDURE — 77080 DXA BONE DENSITY AXIAL: CPT

## 2022-11-03 ENCOUNTER — PATIENT MESSAGE (OUTPATIENT)
Dept: HEALTH INFORMATION MANAGEMENT | Facility: OTHER | Age: 67
End: 2022-11-03

## 2023-09-11 ENCOUNTER — TELEPHONE (OUTPATIENT)
Dept: HEALTH INFORMATION MANAGEMENT | Facility: OTHER | Age: 68
End: 2023-09-11

## 2024-11-04 ENCOUNTER — APPOINTMENT (OUTPATIENT)
Dept: MEDICAL GROUP | Facility: PHYSICIAN GROUP | Age: 69
End: 2024-11-04
Payer: MEDICARE

## 2024-11-04 VITALS
TEMPERATURE: 98.1 F | HEART RATE: 66 BPM | OXYGEN SATURATION: 98 % | DIASTOLIC BLOOD PRESSURE: 72 MMHG | BODY MASS INDEX: 27.75 KG/M2 | HEIGHT: 67 IN | SYSTOLIC BLOOD PRESSURE: 114 MMHG | WEIGHT: 176.8 LBS

## 2024-11-04 DIAGNOSIS — Z76.89 ENCOUNTER TO ESTABLISH CARE WITH NEW DOCTOR: ICD-10-CM

## 2024-11-04 DIAGNOSIS — D72.819 LEUKOPENIA, UNSPECIFIED TYPE: ICD-10-CM

## 2024-11-04 DIAGNOSIS — R20.0 NUMBNESS OF TOES: ICD-10-CM

## 2024-11-04 DIAGNOSIS — Z28.20 VACCINE REFUSED BY PATIENT: ICD-10-CM

## 2024-11-04 DIAGNOSIS — M25.562 CHRONIC PAIN OF LEFT KNEE: ICD-10-CM

## 2024-11-04 DIAGNOSIS — K63.5 POLYP OF COLON, UNSPECIFIED PART OF COLON, UNSPECIFIED TYPE: ICD-10-CM

## 2024-11-04 DIAGNOSIS — E55.9 VITAMIN D DEFICIENCY: ICD-10-CM

## 2024-11-04 DIAGNOSIS — Z12.31 ENCOUNTER FOR SCREENING MAMMOGRAM FOR MALIGNANT NEOPLASM OF BREAST: ICD-10-CM

## 2024-11-04 DIAGNOSIS — R53.82 CHRONIC FATIGUE: ICD-10-CM

## 2024-11-04 DIAGNOSIS — G89.29 CHRONIC PAIN OF LEFT KNEE: ICD-10-CM

## 2024-11-04 DIAGNOSIS — E78.5 DYSLIPIDEMIA: ICD-10-CM

## 2024-11-04 PROCEDURE — 3078F DIAST BP <80 MM HG: CPT | Performed by: INTERNAL MEDICINE

## 2024-11-04 PROCEDURE — 99204 OFFICE O/P NEW MOD 45 MIN: CPT | Performed by: INTERNAL MEDICINE

## 2024-11-04 PROCEDURE — 3074F SYST BP LT 130 MM HG: CPT | Performed by: INTERNAL MEDICINE

## 2024-11-04 ASSESSMENT — PATIENT HEALTH QUESTIONNAIRE - PHQ9: CLINICAL INTERPRETATION OF PHQ2 SCORE: 0

## 2024-11-04 NOTE — ASSESSMENT & PLAN NOTE
Chronic condition affecting all 10 toes.  Patient denies significant pain.  She denies fever or chills.  Patient stated that she has been tested for diabetes which was negative by her former PCP.

## 2024-11-04 NOTE — ASSESSMENT & PLAN NOTE
Chronic condition but the patient has been taking vitamin D supplementation.  Lab test requested for follow-up.

## 2024-11-04 NOTE — ASSESSMENT & PLAN NOTE
Chronic condition.  Chart review show patient had colonoscopy done in 2021 and GI specialist recommend to repeat the colonoscopy in 2031.  Patient denies fever chills abdominal pain change in bowel movement or GI bleeding.

## 2024-11-04 NOTE — PROGRESS NOTES
PRIMARY CARE CLINIC VISIT        Chief Complaint   Patient presents with    New Patient     Establish care.       New patient here to establish care  Leukopenia  Numbness of toes  Hyperlipidemia  Chronic left knee pain  Colon polyp  Vitamin D deficiency  BMI  Mammogram request  Vaccination status  Lab test request        History of Present Illness     Leukopenia  This is a chronic condition.  Chart review shows white blood count has been slightly low since at least 2013.  Patient asymptomatic.  She denies fever chills night sweats unexplained weight loss or any other symptom.    Numbness of toes  Chronic condition affecting all 10 toes.  Patient denies significant pain.  She denies fever or chills.  Patient stated that she has been tested for diabetes which was negative by her former PCP.    Dyslipidemia  Chronic condition.  Patient presently on diet therapy.  Patient is due for lab test.    Left knee pain  This is a chronic condition.  Patient status post fall July 2024.  Patient was seen by orthopedic specialist.  She has completed physical therapy.  Patient has reported improvement    Encounter for screening mammogram for malignant neoplasm of breast  Patient is due for mammogram which has been ordered today.    Colon polyp  Chronic condition.  Chart review show patient had colonoscopy done in 2021 and GI specialist recommend to repeat the colonoscopy in 2031.  Patient denies fever chills abdominal pain change in bowel movement or GI bleeding.    Vitamin D deficiency  Chronic condition but the patient has been taking vitamin D supplementation.  Lab test requested for follow-up.    Vaccine refused by patient  Review showed pt is due for flu shot, shingle vaccine and pneumovax    However pt refused all vaccines    Current Outpatient Medications on File Prior to Visit   Medication Sig Dispense Refill    Multiple Vitamins-Minerals (ZINC PO) Take  by mouth.      Cholecalciferol (VITAMIN D PO) Take 1,000 Units by mouth  "every day.      BIOTIN PO Take  by mouth.      Multiple Vitamins-Minerals (MULTIVITAMIN PO) Take 1 Tab by mouth every day.         No current facility-administered medications on file prior to visit.        Allergies: Patient has no known allergies.    Current Outpatient Medications Ordered in Epic   Medication Sig Dispense Refill    Multiple Vitamins-Minerals (ZINC PO) Take  by mouth.      Cholecalciferol (VITAMIN D PO) Take 1,000 Units by mouth every day.      BIOTIN PO Take  by mouth.      Multiple Vitamins-Minerals (MULTIVITAMIN PO) Take 1 Tab by mouth every day.         No current Epic-ordered facility-administered medications on file.       Past Medical History:   Diagnosis Date    Insomnia     Other specified symptom associated with female genital organs     abnormal pap, HPV       Past Surgical History:   Procedure Laterality Date    CERVICAL CONIZATION  3/6/2013    Performed by Rona Jacobsen M.D. at [facility]    DILATION AND CURETTAGE      retained placenta       Family History   Problem Relation Age of Onset    Hypertension Mother     Thyroid Mother     Arthritis Mother         spine - scoliosis    GI Disease Mother         GERD    Diabetes Father     Hypertension Father     Lung Disease Father         COPD    Thyroid Father     Cancer Brother         malignant melanoma       Social History     Tobacco Use   Smoking Status Never   Smokeless Tobacco Never       Social History     Substance and Sexual Activity   Alcohol Use Yes    Alcohol/week: 0.0 oz    Comment: weekends       Review of systems  As per HPI above. All other systems reviewed and negative.      Past Medical, Social, and Family history reviewed and updated in [facility]       LAB DATA:     I have independently reviewed / interpreted labs    No results found for: \"HBA1C\"     Lab Results   Component Value Date/Time    WBC 3.2 (L) 08/18/2021 08:15 AM    WBC 3.5 (L) 07/16/2020 10:35 AM    HEMOGLOBIN 12.9 08/18/2021 08:15 AM    " "HEMOGLOBIN 13.4 07/16/2020 10:35 AM    HEMATOCRIT 39.8 08/18/2021 08:15 AM    HEMATOCRIT 41.5 07/16/2020 10:35 AM    MCV 94 08/18/2021 08:15 AM    MCV 99.3 (H) 07/16/2020 10:35 AM    PLATELETCT 216 08/18/2021 08:15 AM    PLATELETCT 195 07/16/2020 10:35 AM       Lab Results   Component Value Date/Time    SODIUM 143 08/18/2021 08:15 AM    SODIUM 143 07/16/2020 10:35 AM    POTASSIUM 4.0 08/18/2021 08:15 AM    POTASSIUM 4.2 07/16/2020 10:35 AM    GLUCOSE 91 08/18/2021 08:15 AM    GLUCOSE 82 07/16/2020 10:35 AM    BUN 12 08/18/2021 08:15 AM    BUN 12 07/16/2020 10:35 AM    CREATININE 0.74 08/18/2021 08:15 AM    CREATININE 0.63 07/16/2020 10:35 AM       Lab Results   Component Value Date/Time    CHOLSTRLTOT 199 08/18/2021 08:15 AM    CHOLSTRLTOT 194 07/16/2020 10:35 AM    TRIGLYCERIDE 81 08/18/2021 08:15 AM    TRIGLYCERIDE 53 07/16/2020 10:35 AM    HDL 86 08/18/2021 08:15 AM    HDL 93 07/16/2020 10:35 AM    LDL 90 07/16/2020 10:35 AM       Lab Results   Component Value Date/Time    ALTSGPT 21 08/18/2021 08:15 AM    ALTSGPT 24 07/16/2020 10:35 AM          Objective     /72 (BP Location: Left arm, Patient Position: Sitting, BP Cuff Size: Adult)   Pulse 66   Temp 36.7 °C (98.1 °F) (Temporal)   Ht 1.702 m (5' 7\")   Wt 80.2 kg (176 lb 12.8 oz)   SpO2 98%    Body mass index is 27.69 kg/m².    General: alert in no apparent distress.  Cardiovascular: regular rate and rhythm  Pulmonary: lungs : no wheezing   Gastrointestinal: BS present.   Knee : No signficant swelling redness or deformity.   ROM limited due to pain specifically w knee flexion/extension.      Exam.  Patient with onychomycosis of the toenails.  Peripheral pulses present at dorsalis pedis and posterior tibialis.  Range of motion of the ankle was unremarkable.  No pain or swelling of the Achilles tendon  Assessment and Plan     1. Encounter to establish care with new doctor    2. Leukopenia, unspecified type  Chronic stable condition.  Recommend to " continue to monitor lab test.  Patient asymptomatic.  - CBC WITH DIFFERENTIAL; Future    3. Numbness of toes  This is a chronic condition.  Suspect possible neuropathy.  Rule out other causes.  Her exam today unremarkable.  I did recommend nerve conduction study to be done.  The patient however declined as her symptoms are mild.  Continue to monitor      4. Dyslipidemia  - ALANINE AMINO-TRANS; Future  - Lipid Profile; Future  Chronic condition.  Current status unclear.  Lab test requested for follow-up.  Recommend diet and lifestyle modification.    5. Chronic pain of left knee  Chronic condition status post fall with left knee pain.  Patient has completed physical therapy.  Recommend to continue follow-up with orthopedic specialist as directed.    6. Polyp of colon, unspecified part of colon, unspecified type  Chronic condition.  The patient has completed colonoscopy in 2021.  GI recommend to repeat the study in 2031.    7. Vitamin D deficiency  - VITAMIN D,25 HYDROXY (DEFICIENCY); Future  Chronic condition.  Current status unclear.  Lab test requested to check vitamin D level    8. BMI 27.0-27.9,adult  This is a chronic condition.  Recommend diet and lifestyle modification.  Encouraged patient to maintain ideal weight.    9. Encounter for screening mammogram for malignant neoplasm of breast  - MA-SCREENING MAMMO BILAT W/TOMOSYNTHESIS W/CAD; Future    10. Vaccine refused by patient  As above the patient refused all recommended vaccines.              Time spent:   48  minutes     Reviewed medical records including:  October 14th 2024 physical therapy note  April 23, 2024 office note  September 18, 2023 orthopedics note  August 3, 2021 medical office note  July 16, 2020 medical office note  July 22, 2019 medical office note  August 8, 2018 medical office note  July 27, 2017 medical office note  September 4, 2013 medical office note    That includes face to face time and non-face to face time.  Chart review before the  visit, the actual patient visit, and time spent on documentation in the EMR after the visit.  Chart review/prep, review of other providers' records, Independent review of imagings/labs ,  time for history/examination , pt's counseling/education, ordering, prescribing, treatment plan discussed with patient, and care coordination.                Please note that this dictation was created using voice recognition software. I have made every reasonable attempt to correct obvious errors, but I expect that there are errors of grammar and possibly content that I did not discover before finalizing the note.    William Molina MD  Internal Medicine  [facility]

## 2024-11-04 NOTE — ASSESSMENT & PLAN NOTE
This is a chronic condition.  Chart review shows white blood count has been slightly low since at least 2013.  Patient asymptomatic.  She denies fever chills night sweats unexplained weight loss or any other symptom.

## 2024-11-04 NOTE — ASSESSMENT & PLAN NOTE
Review showed pt is due for flu shot, shingle vaccine and pneumovax    However pt refused all vaccines

## 2024-11-04 NOTE — ASSESSMENT & PLAN NOTE
This is a chronic condition.  Patient status post fall July 2024.  Patient was seen by orthopedic specialist.  She has completed physical therapy.  Patient has reported improvement

## 2025-02-13 ENCOUNTER — HOSPITAL ENCOUNTER (OUTPATIENT)
Dept: RADIOLOGY | Facility: MEDICAL CENTER | Age: 70
End: 2025-02-13
Attending: INTERNAL MEDICINE
Payer: MEDICARE

## 2025-02-13 DIAGNOSIS — Z12.31 ENCOUNTER FOR SCREENING MAMMOGRAM FOR MALIGNANT NEOPLASM OF BREAST: ICD-10-CM

## 2025-02-13 PROCEDURE — 77067 SCR MAMMO BI INCL CAD: CPT

## 2025-02-14 ENCOUNTER — RESULTS FOLLOW-UP (OUTPATIENT)
Dept: MEDICAL GROUP | Facility: PHYSICIAN GROUP | Age: 70
End: 2025-02-14

## 2025-04-28 ENCOUNTER — HOSPITAL ENCOUNTER (OUTPATIENT)
Dept: LAB | Facility: MEDICAL CENTER | Age: 70
End: 2025-04-28
Attending: INTERNAL MEDICINE
Payer: MEDICARE

## 2025-04-28 DIAGNOSIS — E78.5 DYSLIPIDEMIA: ICD-10-CM

## 2025-04-28 DIAGNOSIS — E55.9 VITAMIN D DEFICIENCY: ICD-10-CM

## 2025-04-28 DIAGNOSIS — R53.82 CHRONIC FATIGUE: ICD-10-CM

## 2025-04-28 DIAGNOSIS — D72.819 LEUKOPENIA, UNSPECIFIED TYPE: ICD-10-CM

## 2025-04-28 LAB
BASOPHILS # BLD AUTO: 0.7 % (ref 0–1.8)
BASOPHILS # BLD: 0.03 K/UL (ref 0–0.12)
CREAT UR-MCNC: 13 MG/DL
EOSINOPHIL # BLD AUTO: 0.26 K/UL (ref 0–0.51)
EOSINOPHIL NFR BLD: 6.2 % (ref 0–6.9)
ERYTHROCYTE [DISTWIDTH] IN BLOOD BY AUTOMATED COUNT: 45.3 FL (ref 35.9–50)
HCT VFR BLD AUTO: 40.4 % (ref 37–47)
HGB BLD-MCNC: 13.4 G/DL (ref 12–16)
IMM GRANULOCYTES # BLD AUTO: 0.01 K/UL (ref 0–0.11)
IMM GRANULOCYTES NFR BLD AUTO: 0.2 % (ref 0–0.9)
LYMPHOCYTES # BLD AUTO: 1.46 K/UL (ref 1–4.8)
LYMPHOCYTES NFR BLD: 34.9 % (ref 22–41)
MCH RBC QN AUTO: 31.8 PG (ref 27–33)
MCHC RBC AUTO-ENTMCNC: 33.2 G/DL (ref 32.2–35.5)
MCV RBC AUTO: 96 FL (ref 81.4–97.8)
MICROALBUMIN UR-MCNC: <1.2 MG/DL
MICROALBUMIN/CREAT UR: NORMAL MG/G (ref 0–30)
MONOCYTES # BLD AUTO: 0.33 K/UL (ref 0–0.85)
MONOCYTES NFR BLD AUTO: 7.9 % (ref 0–13.4)
NEUTROPHILS # BLD AUTO: 2.09 K/UL (ref 1.82–7.42)
NEUTROPHILS NFR BLD: 50.1 % (ref 44–72)
NRBC # BLD AUTO: 0 K/UL
NRBC BLD-RTO: 0 /100 WBC (ref 0–0.2)
PLATELET # BLD AUTO: 213 K/UL (ref 164–446)
PMV BLD AUTO: 10.3 FL (ref 9–12.9)
RBC # BLD AUTO: 4.21 M/UL (ref 4.2–5.4)
WBC # BLD AUTO: 4.2 K/UL (ref 4.8–10.8)

## 2025-04-28 PROCEDURE — 80061 LIPID PANEL: CPT

## 2025-04-28 PROCEDURE — 82570 ASSAY OF URINE CREATININE: CPT

## 2025-04-28 PROCEDURE — 84443 ASSAY THYROID STIM HORMONE: CPT

## 2025-04-28 PROCEDURE — 36415 COLL VENOUS BLD VENIPUNCTURE: CPT

## 2025-04-28 PROCEDURE — 84460 ALANINE AMINO (ALT) (SGPT): CPT

## 2025-04-28 PROCEDURE — 80048 BASIC METABOLIC PNL TOTAL CA: CPT

## 2025-04-28 PROCEDURE — 82306 VITAMIN D 25 HYDROXY: CPT

## 2025-04-28 PROCEDURE — 82043 UR ALBUMIN QUANTITATIVE: CPT

## 2025-04-28 PROCEDURE — 85025 COMPLETE CBC W/AUTO DIFF WBC: CPT

## 2025-04-29 ENCOUNTER — RESULTS FOLLOW-UP (OUTPATIENT)
Dept: MEDICAL GROUP | Facility: PHYSICIAN GROUP | Age: 70
End: 2025-04-29
Payer: COMMERCIAL

## 2025-04-29 LAB
25(OH)D3 SERPL-MCNC: 57 NG/ML (ref 30–100)
ALT SERPL-CCNC: 23 U/L (ref 2–50)
ANION GAP SERPL CALC-SCNC: 10 MMOL/L (ref 7–16)
BUN SERPL-MCNC: 14 MG/DL (ref 8–22)
CALCIUM SERPL-MCNC: 10.2 MG/DL (ref 8.5–10.5)
CHLORIDE SERPL-SCNC: 106 MMOL/L (ref 96–112)
CHOLEST SERPL-MCNC: 215 MG/DL (ref 100–199)
CO2 SERPL-SCNC: 24 MMOL/L (ref 20–33)
CREAT SERPL-MCNC: 0.63 MG/DL (ref 0.5–1.4)
GFR SERPLBLD CREATININE-BSD FMLA CKD-EPI: 96 ML/MIN/1.73 M 2
GLUCOSE SERPL-MCNC: 85 MG/DL (ref 65–99)
HDLC SERPL-MCNC: 98 MG/DL
LDLC SERPL CALC-MCNC: 103 MG/DL
POTASSIUM SERPL-SCNC: 4.1 MMOL/L (ref 3.6–5.5)
SODIUM SERPL-SCNC: 140 MMOL/L (ref 135–145)
TRIGL SERPL-MCNC: 70 MG/DL (ref 0–149)
TSH SERPL-ACNC: 2.52 UIU/ML (ref 0.38–5.33)

## 2025-05-05 ENCOUNTER — OFFICE VISIT (OUTPATIENT)
Dept: MEDICAL GROUP | Facility: PHYSICIAN GROUP | Age: 70
End: 2025-05-05
Payer: MEDICARE

## 2025-05-05 VITALS
RESPIRATION RATE: 16 BRPM | WEIGHT: 180.8 LBS | TEMPERATURE: 98 F | HEIGHT: 67 IN | HEART RATE: 58 BPM | OXYGEN SATURATION: 99 % | SYSTOLIC BLOOD PRESSURE: 112 MMHG | DIASTOLIC BLOOD PRESSURE: 58 MMHG | BODY MASS INDEX: 28.38 KG/M2

## 2025-05-05 DIAGNOSIS — E78.5 DYSLIPIDEMIA: Chronic | ICD-10-CM

## 2025-05-05 DIAGNOSIS — K63.5 POLYP OF COLON, UNSPECIFIED PART OF COLON, UNSPECIFIED TYPE: Chronic | ICD-10-CM

## 2025-05-05 DIAGNOSIS — E66.3 OVERWEIGHT: Chronic | ICD-10-CM

## 2025-05-05 DIAGNOSIS — E55.9 VITAMIN D DEFICIENCY: Chronic | ICD-10-CM

## 2025-05-05 DIAGNOSIS — D72.819 LEUKOPENIA, UNSPECIFIED TYPE: Chronic | ICD-10-CM

## 2025-05-05 PROBLEM — R20.0 NUMBNESS OF TOES: Status: RESOLVED | Noted: 2024-11-04 | Resolved: 2025-05-05

## 2025-05-05 PROBLEM — Z76.89 ENCOUNTER TO ESTABLISH CARE WITH NEW DOCTOR: Status: RESOLVED | Noted: 2024-11-04 | Resolved: 2025-05-05

## 2025-05-05 PROCEDURE — 99214 OFFICE O/P EST MOD 30 MIN: CPT | Performed by: INTERNAL MEDICINE

## 2025-05-05 PROCEDURE — 3074F SYST BP LT 130 MM HG: CPT | Performed by: INTERNAL MEDICINE

## 2025-05-05 PROCEDURE — 3078F DIAST BP <80 MM HG: CPT | Performed by: INTERNAL MEDICINE

## 2025-05-05 ASSESSMENT — PATIENT HEALTH QUESTIONNAIRE - PHQ9: CLINICAL INTERPRETATION OF PHQ2 SCORE: 0

## 2025-05-05 NOTE — PROGRESS NOTES
PRIMARY CARE CLINIC VISIT        Chief Complaint   Patient presents with    Follow-Up    Lab Results      Discuss lab test result  hyperlipidemia  Leukopenia  Overweight  Colon polyp  Vitamin D deficiency      History of Present Illness     Leukopenia  This is a chronic condition.  The white blood count has been in the low range since the last several years.  Patient asymptomatic.  Patient denies fever chills night sweats unexplained weight loss.    Dyslipidemia  This is a new condition.  Recent lab test showed elevated cholesterol panel.  Lab test result discussed with the patient.  The 10-year ASCVD risk score (Germaine MARTIN, et al., 2019) is: 5.9%    Values used to calculate the score:      Age: 69 years      Sex: Female      Is Non- : No      Diabetic: No      Tobacco smoker: No      Systolic Blood Pressure: 112 mmHg      Is BP treated: No      HDL Cholesterol: 98 mg/dL      Total Cholesterol: 215 mg/dL        Reported that she has been taking care of family members with medical issue and the patient has not been able to exercise and has not been able to follow a good diet.  Patient asymptomatic    Overweight  Body mass index is 28.32 kg/m².   Chronic condition.  Recommend pt to follow a healthy , well balance diet  Pt to continue with regular exercise activity and to maintain ideal weight.      Has gained approximately 4 pounds since her last visit.    Colon polyp  Chronic condition.  Last colonoscopy completed in 2021.  GI recommend to repeat the study in 2031.  The patient denies fever or chills abdominal pain or GI bleeding.  Also denies changes in bowel movement.    Vitamin D deficiency  Chronic condition.  Recent lab test showed normal vitamin D level.  Lab test result discussed with the patient.  Patient currently taking vitamin D supplementation.    Current Outpatient Medications on File Prior to Visit   Medication Sig Dispense Refill    Cholecalciferol (VITAMIN D PO) Take 1,000 Units  "by mouth every day.      BIOTIN PO Take  by mouth.      Multiple Vitamins-Minerals (MULTIVITAMIN PO) Take 1 Tab by mouth every day.        Multiple Vitamins-Minerals (ZINC PO) Take  by mouth. (Patient not taking: Reported on 5/5/2025)       No current facility-administered medications on file prior to visit.        Allergies: Patient has no known allergies.    Current Outpatient Medications Ordered in Epic   Medication Sig Dispense Refill    Cholecalciferol (VITAMIN D PO) Take 1,000 Units by mouth every day.      BIOTIN PO Take  by mouth.      Multiple Vitamins-Minerals (MULTIVITAMIN PO) Take 1 Tab by mouth every day.        Multiple Vitamins-Minerals (ZINC PO) Take  by mouth. (Patient not taking: Reported on 5/5/2025)       No current [facility]-ordered facility-administered medications on file.       Past Medical History:   Diagnosis Date    Insomnia     Other specified symptom associated with female genital organs     abnormal pap, HPV       Past Surgical History:   Procedure Laterality Date    CERVICAL CONIZATION  3/6/2013    Performed by Rona Jacobsen M.D. at [facility]    DILATION AND CURETTAGE      retained placenta       Family History   Problem Relation Age of Onset    Hypertension Mother     Thyroid Mother     Arthritis Mother         spine - scoliosis    GI Disease Mother         GERD    Diabetes Father     Hypertension Father     Lung Disease Father         COPD    Thyroid Father     Cancer Brother         malignant melanoma       Social History     Tobacco Use   Smoking Status Never   Smokeless Tobacco Never       Social History     Substance and Sexual Activity   Alcohol Use Yes    Alcohol/week: 0.0 oz    Comment: weekends       Review of systems  As per HPI above. All other systems reviewed and negative.      Past Medical, Social, and Family history reviewed and updated in [facility]       LAB DATA:     I have independently reviewed / interpreted labs    No results found for: \"HBA1C\" " "    Lab Results   Component Value Date/Time    WBC 4.2 (L) 04/28/2025 12:11 PM    HEMOGLOBIN 13.4 04/28/2025 12:11 PM    HEMATOCRIT 40.4 04/28/2025 12:11 PM    MCV 96.0 04/28/2025 12:11 PM    PLATELETCT 213 04/28/2025 12:11 PM       Lab Results   Component Value Date/Time    SODIUM 140 04/28/2025 12:11 PM    POTASSIUM 4.1 04/28/2025 12:11 PM    GLUCOSE 85 04/28/2025 12:11 PM    BUN 14 04/28/2025 12:11 PM    CREATININE 0.63 04/28/2025 12:11 PM       Lab Results   Component Value Date/Time    CHOLSTRLTOT 215 (H) 04/28/2025 12:11 PM    TRIGLYCERIDE 70 04/28/2025 12:11 PM    HDL 98 04/28/2025 12:11 PM     (H) 04/28/2025 12:11 PM       Lab Results   Component Value Date/Time    ALTSGPT 23 04/28/2025 12:11 PM          Objective     /58 (BP Location: Left arm, Patient Position: Sitting, BP Cuff Size: Adult)   Pulse (!) 58   Temp 36.7 °C (98 °F) (Temporal)   Resp 16   Ht 1.702 m (5' 7\")   Wt 82 kg (180 lb 12.8 oz)   SpO2 99%    Body mass index is 28.32 kg/m².    General: alert in no apparent distress.  Cardiovascular: regular rate and rhythm  Pulmonary: lungs : no wheezing   Gastrointestinal: BS present.       Assessment and Plan     1. Dyslipidemia  This is a new condition.  Lab test result discussed with patient.  Medication not recommended at this time.  Recommended diet and exercise.  Repeat lab test in 6 months for follow-up  - Lipid Profile; Future    2. Leukopenia, unspecified type  Chronic condition.  Stable.  Recent white blood count result discussed with the patient.  Patient currently asymptomatic.  Continue to monitor at least yearly    3. Overweight  Chronic condition.  Uncontrolled.  Recommend diet and lifestyle modification.  Encourage patient to maintain ideal weight    4. Vitamin D deficiency  Chronic stable.  Lab test result discussed with the patient.  Continue vitamin D limitation daily    5. Polyp of colon, unspecified part of colon, unspecified type  This is a chronic stable " condition.  Patient repeat colonoscopy in 2031.    6.Recommended Pt to get shingles vaccine from the pharmacy as this is not available in the office.                Please note that this dictation was created using voice recognition software. I have made every reasonable attempt to correct obvious errors, but I expect that there are errors of grammar and possibly content that I did not discover before finalizing the note.    William Molina MD  Internal Medicine  [facility]

## 2025-05-05 NOTE — ASSESSMENT & PLAN NOTE
This is a chronic condition.  The white blood count has been in the low range since the last several years.  Patient asymptomatic.  Patient denies fever chills night sweats unexplained weight loss.

## 2025-05-05 NOTE — ASSESSMENT & PLAN NOTE
Body mass index is 28.32 kg/m².   Chronic condition.  Recommend pt to follow a healthy , well balance diet  Pt to continue with regular exercise activity and to maintain ideal weight.      Has gained approximately 4 pounds since her last visit.

## 2025-05-05 NOTE — ASSESSMENT & PLAN NOTE
This is a new condition.  Recent lab test showed elevated cholesterol panel.  Lab test result discussed with the patient.  The 10-year ASCVD risk score (Germaine MARTIN, et al., 2019) is: 5.9%    Values used to calculate the score:      Age: 69 years      Sex: Female      Is Non- : No      Diabetic: No      Tobacco smoker: No      Systolic Blood Pressure: 112 mmHg      Is BP treated: No      HDL Cholesterol: 98 mg/dL      Total Cholesterol: 215 mg/dL        Reported that she has been taking care of family members with medical issue and the patient has not been able to exercise and has not been able to follow a good diet.  Patient asymptomatic

## 2025-05-05 NOTE — ASSESSMENT & PLAN NOTE
Chronic condition.  Last colonoscopy completed in 2021.  GI recommend to repeat the study in 2031.  The patient denies fever or chills abdominal pain or GI bleeding.  Also denies changes in bowel movement.

## 2025-05-05 NOTE — ASSESSMENT & PLAN NOTE
Chronic condition.  Recent lab test showed normal vitamin D level.  Lab test result discussed with the patient.  Patient currently taking vitamin D supplementation.